# Patient Record
Sex: MALE | Race: WHITE | NOT HISPANIC OR LATINO | Employment: OTHER | URBAN - METROPOLITAN AREA
[De-identification: names, ages, dates, MRNs, and addresses within clinical notes are randomized per-mention and may not be internally consistent; named-entity substitution may affect disease eponyms.]

---

## 2023-04-20 DIAGNOSIS — I10 BENIGN ESSENTIAL HYPERTENSION: Primary | ICD-10-CM

## 2023-04-25 PROBLEM — I10 BENIGN ESSENTIAL HYPERTENSION: Status: ACTIVE | Noted: 2023-04-25

## 2023-04-25 RX ORDER — CHLORTHALIDONE 25 MG/1
TABLET ORAL
Qty: 45 TABLET | Refills: 3 | Status: SHIPPED | OUTPATIENT
Start: 2023-04-25 | End: 2024-01-04

## 2023-06-01 DIAGNOSIS — I10 BENIGN ESSENTIAL HYPERTENSION: ICD-10-CM

## 2023-06-05 RX ORDER — METOPROLOL SUCCINATE 50 MG/1
TABLET, EXTENDED RELEASE ORAL
Qty: 270 TABLET | Refills: 3 | Status: SHIPPED | OUTPATIENT
Start: 2023-06-05 | End: 2024-01-04

## 2023-06-05 RX ORDER — OMEPRAZOLE 40 MG/1
CAPSULE, DELAYED RELEASE ORAL
Qty: 180 CAPSULE | Refills: 3 | Status: SHIPPED | OUTPATIENT
Start: 2023-06-05 | End: 2024-01-04

## 2023-07-14 DIAGNOSIS — E03.9 HYPOTHYROIDISM, UNSPECIFIED TYPE: Primary | ICD-10-CM

## 2023-07-18 RX ORDER — LEVOTHYROXINE SODIUM 75 UG/1
TABLET ORAL
Qty: 103 TABLET | Refills: 3 | Status: SHIPPED | OUTPATIENT
Start: 2023-07-18 | End: 2024-02-15 | Stop reason: SDUPTHER

## 2023-08-16 LAB
ANION GAP IN SER/PLAS: 14 MMOL/L (ref 10–20)
CALCIUM (MG/DL) IN SER/PLAS: 9.9 MG/DL (ref 8.6–10.6)
CARBON DIOXIDE, TOTAL (MMOL/L) IN SER/PLAS: 28 MMOL/L (ref 21–32)
CHLORIDE (MMOL/L) IN SER/PLAS: 103 MMOL/L (ref 98–107)
CREATININE (MG/DL) IN SER/PLAS: 1.09 MG/DL (ref 0.5–1.3)
GFR MALE: 72 ML/MIN/1.73M2
GLUCOSE (MG/DL) IN SER/PLAS: 118 MG/DL (ref 74–99)
POTASSIUM (MMOL/L) IN SER/PLAS: 3.9 MMOL/L (ref 3.5–5.3)
SODIUM (MMOL/L) IN SER/PLAS: 141 MMOL/L (ref 136–145)
UREA NITROGEN (MG/DL) IN SER/PLAS: 18 MG/DL (ref 6–23)

## 2023-08-22 ENCOUNTER — HOSPITAL ENCOUNTER (OUTPATIENT)
Dept: DATA CONVERSION | Facility: HOSPITAL | Age: 73
End: 2023-08-22
Attending: OPHTHALMOLOGY | Admitting: OPHTHALMOLOGY
Payer: MEDICARE

## 2023-08-22 DIAGNOSIS — H25.811 COMBINED FORMS OF AGE-RELATED CATARACT, RIGHT EYE: ICD-10-CM

## 2023-08-22 DIAGNOSIS — H21.271: ICD-10-CM

## 2023-08-22 DIAGNOSIS — H26.9 UNSPECIFIED CATARACT: ICD-10-CM

## 2023-08-30 ENCOUNTER — OFFICE VISIT (OUTPATIENT)
Dept: PRIMARY CARE | Facility: CLINIC | Age: 73
End: 2023-08-30
Payer: MEDICARE

## 2023-08-30 ENCOUNTER — LAB (OUTPATIENT)
Dept: LAB | Facility: LAB | Age: 73
End: 2023-08-30
Payer: MEDICARE

## 2023-08-30 VITALS
WEIGHT: 194 LBS | TEMPERATURE: 98.7 F | HEIGHT: 64 IN | BODY MASS INDEX: 33.12 KG/M2 | HEART RATE: 80 BPM | SYSTOLIC BLOOD PRESSURE: 114 MMHG | DIASTOLIC BLOOD PRESSURE: 67 MMHG

## 2023-08-30 DIAGNOSIS — E03.9 HYPOTHYROIDISM, UNSPECIFIED TYPE: ICD-10-CM

## 2023-08-30 DIAGNOSIS — Z12.5 PROSTATE CANCER SCREENING: ICD-10-CM

## 2023-08-30 DIAGNOSIS — I25.10 CORONARY ARTERIOSCLEROSIS: ICD-10-CM

## 2023-08-30 DIAGNOSIS — I10 BENIGN ESSENTIAL HYPERTENSION: ICD-10-CM

## 2023-08-30 DIAGNOSIS — I48.0 PAROXYSMAL ATRIAL FIBRILLATION (MULTI): ICD-10-CM

## 2023-08-30 DIAGNOSIS — Z00.00 ROUTINE GENERAL MEDICAL EXAMINATION AT HEALTH CARE FACILITY: Primary | ICD-10-CM

## 2023-08-30 DIAGNOSIS — K22.70 BARRETT'S ESOPHAGUS WITHOUT DYSPLASIA: ICD-10-CM

## 2023-08-30 PROBLEM — M11.269 CHONDROCALCINOSIS DUE TO DICALCIUM PHOSPHATE CRYSTALS, OF THE KNEE: Status: ACTIVE | Noted: 2023-08-30

## 2023-08-30 PROBLEM — E78.00 HYPERCHOLESTEROLEMIA: Status: ACTIVE | Noted: 2023-08-30

## 2023-08-30 PROBLEM — H53.2 MONOCULAR DIPLOPIA OF LEFT EYE: Status: ACTIVE | Noted: 2023-08-30

## 2023-08-30 PROBLEM — E66.9 OBESITY (BMI 30-39.9): Status: ACTIVE | Noted: 2023-08-30

## 2023-08-30 PROBLEM — M40.30 FLAT-BACK SYNDROME: Status: ACTIVE | Noted: 2023-08-30

## 2023-08-30 PROBLEM — M48.02 CERVICAL SPINAL STENOSIS: Status: ACTIVE | Noted: 2023-08-30

## 2023-08-30 PROBLEM — N40.1 BPH WITH OBSTRUCTION/LOWER URINARY TRACT SYMPTOMS: Status: ACTIVE | Noted: 2023-08-30

## 2023-08-30 PROBLEM — F32.A DEPRESSION: Status: ACTIVE | Noted: 2023-08-30

## 2023-08-30 PROBLEM — N13.8 BPH WITH OBSTRUCTION/LOWER URINARY TRACT SYMPTOMS: Status: ACTIVE | Noted: 2023-08-30

## 2023-08-30 PROBLEM — C44.229 SQUAMOUS CELL CARCINOMA OF SKIN OF LEFT EAR AND EXTERNAL AURICULAR CANAL: Status: ACTIVE | Noted: 2020-11-03

## 2023-08-30 PROBLEM — F41.9 ANXIETY DISORDER: Status: ACTIVE | Noted: 2023-08-30

## 2023-08-30 PROBLEM — H90.3 BILATERAL SENSORINEURAL HEARING LOSS: Status: ACTIVE | Noted: 2023-08-30

## 2023-08-30 LAB
ALANINE AMINOTRANSFERASE (SGPT) (U/L) IN SER/PLAS: 18 U/L (ref 10–52)
ALBUMIN (G/DL) IN SER/PLAS: 4.4 G/DL (ref 3.4–5)
ALKALINE PHOSPHATASE (U/L) IN SER/PLAS: 88 U/L (ref 33–136)
ANION GAP IN SER/PLAS: 11 MMOL/L (ref 10–20)
ASPARTATE AMINOTRANSFERASE (SGOT) (U/L) IN SER/PLAS: 17 U/L (ref 9–39)
BASOPHILS (10*3/UL) IN BLOOD BY AUTOMATED COUNT: 0.02 X10E9/L (ref 0–0.1)
BASOPHILS/100 LEUKOCYTES IN BLOOD BY AUTOMATED COUNT: 0.2 % (ref 0–2)
BILIRUBIN TOTAL (MG/DL) IN SER/PLAS: 0.5 MG/DL (ref 0–1.2)
CALCIUM (MG/DL) IN SER/PLAS: 9.7 MG/DL (ref 8.6–10.6)
CARBON DIOXIDE, TOTAL (MMOL/L) IN SER/PLAS: 29 MMOL/L (ref 21–32)
CHLORIDE (MMOL/L) IN SER/PLAS: 104 MMOL/L (ref 98–107)
CHOLESTEROL (MG/DL) IN SER/PLAS: 109 MG/DL (ref 0–199)
CHOLESTEROL IN HDL (MG/DL) IN SER/PLAS: 44.2 MG/DL
CHOLESTEROL/HDL RATIO: 2.5
CREATININE (MG/DL) IN SER/PLAS: 1.04 MG/DL (ref 0.5–1.3)
EOSINOPHILS (10*3/UL) IN BLOOD BY AUTOMATED COUNT: 0.02 X10E9/L (ref 0–0.4)
EOSINOPHILS/100 LEUKOCYTES IN BLOOD BY AUTOMATED COUNT: 0.2 % (ref 0–6)
ERYTHROCYTE DISTRIBUTION WIDTH (RATIO) BY AUTOMATED COUNT: 13.7 % (ref 11.5–14.5)
ERYTHROCYTE MEAN CORPUSCULAR HEMOGLOBIN CONCENTRATION (G/DL) BY AUTOMATED: 31.9 G/DL (ref 32–36)
ERYTHROCYTE MEAN CORPUSCULAR VOLUME (FL) BY AUTOMATED COUNT: 97 FL (ref 80–100)
ERYTHROCYTES (10*6/UL) IN BLOOD BY AUTOMATED COUNT: 3.8 X10E12/L (ref 4.5–5.9)
GFR MALE: 76 ML/MIN/1.73M2
GLUCOSE (MG/DL) IN SER/PLAS: 86 MG/DL (ref 74–99)
HEMATOCRIT (%) IN BLOOD BY AUTOMATED COUNT: 36.7 % (ref 41–52)
HEMOGLOBIN (G/DL) IN BLOOD: 11.7 G/DL (ref 13.5–17.5)
IMMATURE GRANULOCYTES/100 LEUKOCYTES IN BLOOD BY AUTOMATED COUNT: 0.2 % (ref 0–0.9)
LDL: 35 MG/DL (ref 0–99)
LEUKOCYTES (10*3/UL) IN BLOOD BY AUTOMATED COUNT: 8.9 X10E9/L (ref 4.4–11.3)
LYMPHOCYTES (10*3/UL) IN BLOOD BY AUTOMATED COUNT: 1.47 X10E9/L (ref 0.8–3)
LYMPHOCYTES/100 LEUKOCYTES IN BLOOD BY AUTOMATED COUNT: 16.5 % (ref 13–44)
MONOCYTES (10*3/UL) IN BLOOD BY AUTOMATED COUNT: 0.82 X10E9/L (ref 0.05–0.8)
MONOCYTES/100 LEUKOCYTES IN BLOOD BY AUTOMATED COUNT: 9.2 % (ref 2–10)
NEUTROPHILS (10*3/UL) IN BLOOD BY AUTOMATED COUNT: 6.56 X10E9/L (ref 1.6–5.5)
NEUTROPHILS/100 LEUKOCYTES IN BLOOD BY AUTOMATED COUNT: 73.7 % (ref 40–80)
NRBC (PER 100 WBCS) BY AUTOMATED COUNT: 0 /100 WBC (ref 0–0)
PLATELETS (10*3/UL) IN BLOOD AUTOMATED COUNT: 231 X10E9/L (ref 150–450)
POTASSIUM (MMOL/L) IN SER/PLAS: 3.7 MMOL/L (ref 3.5–5.3)
PROSTATE SPECIFIC ANTIGEN,SCREEN: 0.96 NG/ML (ref 0–4)
PROTEIN TOTAL: 7 G/DL (ref 6.4–8.2)
SODIUM (MMOL/L) IN SER/PLAS: 140 MMOL/L (ref 136–145)
THYROTROPIN (MIU/L) IN SER/PLAS BY DETECTION LIMIT <= 0.05 MIU/L: 2.35 MIU/L (ref 0.44–3.98)
TRIGLYCERIDE (MG/DL) IN SER/PLAS: 147 MG/DL (ref 0–149)
UREA NITROGEN (MG/DL) IN SER/PLAS: 21 MG/DL (ref 6–23)
VLDL: 29 MG/DL (ref 0–40)

## 2023-08-30 PROCEDURE — 3074F SYST BP LT 130 MM HG: CPT | Performed by: INTERNAL MEDICINE

## 2023-08-30 PROCEDURE — 36415 COLL VENOUS BLD VENIPUNCTURE: CPT

## 2023-08-30 PROCEDURE — 1157F ADVNC CARE PLAN IN RCRD: CPT | Performed by: INTERNAL MEDICINE

## 2023-08-30 PROCEDURE — G0439 PPPS, SUBSEQ VISIT: HCPCS | Performed by: INTERNAL MEDICINE

## 2023-08-30 PROCEDURE — 84443 ASSAY THYROID STIM HORMONE: CPT

## 2023-08-30 PROCEDURE — 1170F FXNL STATUS ASSESSED: CPT | Performed by: INTERNAL MEDICINE

## 2023-08-30 PROCEDURE — G0103 PSA SCREENING: HCPCS

## 2023-08-30 PROCEDURE — 3008F BODY MASS INDEX DOCD: CPT | Performed by: INTERNAL MEDICINE

## 2023-08-30 PROCEDURE — 80061 LIPID PANEL: CPT

## 2023-08-30 PROCEDURE — 80053 COMPREHEN METABOLIC PANEL: CPT

## 2023-08-30 PROCEDURE — 1125F AMNT PAIN NOTED PAIN PRSNT: CPT | Performed by: INTERNAL MEDICINE

## 2023-08-30 PROCEDURE — 85025 COMPLETE CBC W/AUTO DIFF WBC: CPT

## 2023-08-30 PROCEDURE — 3078F DIAST BP <80 MM HG: CPT | Performed by: INTERNAL MEDICINE

## 2023-08-30 RX ORDER — ACETAMINOPHEN 500 MG
1000 TABLET ORAL 2 TIMES DAILY
COMMUNITY
Start: 2020-08-19

## 2023-08-30 RX ORDER — TAMSULOSIN HYDROCHLORIDE 0.4 MG/1
0.4 CAPSULE ORAL DAILY
COMMUNITY
Start: 2016-01-31 | End: 2024-01-06 | Stop reason: SDUPTHER

## 2023-08-30 RX ORDER — FLECAINIDE ACETATE 100 MG/1
300 TABLET ORAL ONCE AS NEEDED
COMMUNITY
Start: 2017-02-01

## 2023-08-30 RX ORDER — ATORVASTATIN CALCIUM 80 MG/1
80 TABLET, FILM COATED ORAL DAILY
COMMUNITY
Start: 2013-02-01 | End: 2023-10-06

## 2023-08-30 RX ORDER — APIXABAN 5 MG/1
1 TABLET, FILM COATED ORAL 2 TIMES DAILY
COMMUNITY
Start: 2017-02-21 | End: 2023-12-04

## 2023-08-30 RX ORDER — EPINEPHRINE 0.22MG
AEROSOL WITH ADAPTER (ML) INHALATION
COMMUNITY
End: 2023-11-29 | Stop reason: ALTCHOICE

## 2023-08-30 RX ORDER — KETOROLAC TROMETHAMINE 5 MG/ML
SOLUTION OPHTHALMIC
COMMUNITY
Start: 2023-06-20 | End: 2023-10-23 | Stop reason: WASHOUT

## 2023-08-30 RX ORDER — SILDENAFIL 50 MG/1
50 TABLET, FILM COATED ORAL AS NEEDED
COMMUNITY
Start: 2023-06-14 | End: 2024-02-14 | Stop reason: SDUPTHER

## 2023-08-30 RX ORDER — OMEGA-3 FATTY ACIDS/FISH OIL 340-1000MG
CAPSULE ORAL 2 TIMES DAILY
COMMUNITY

## 2023-08-30 RX ORDER — PREDNISOLONE ACETATE 10 MG/ML
SUSPENSION/ DROPS OPHTHALMIC 4 TIMES DAILY
COMMUNITY
Start: 2023-06-20 | End: 2023-10-23 | Stop reason: WASHOUT

## 2023-08-30 RX ORDER — OFLOXACIN 3 MG/ML
SOLUTION/ DROPS OPHTHALMIC 4 TIMES DAILY
COMMUNITY
Start: 2023-06-20 | End: 2023-10-23 | Stop reason: WASHOUT

## 2023-08-30 RX ORDER — BUPROPION HYDROCHLORIDE 150 MG/1
2 TABLET ORAL DAILY
COMMUNITY

## 2023-08-30 RX ORDER — GABAPENTIN 300 MG/1
300 CAPSULE ORAL NIGHTLY
COMMUNITY
End: 2024-03-04 | Stop reason: SDUPTHER

## 2023-08-30 ASSESSMENT — ACTIVITIES OF DAILY LIVING (ADL)
BATHING: INDEPENDENT
DRESSING: INDEPENDENT
MANAGING_FINANCES: INDEPENDENT
DOING_HOUSEWORK: INDEPENDENT
GROCERY_SHOPPING: INDEPENDENT
TAKING_MEDICATION: INDEPENDENT

## 2023-08-30 NOTE — PROGRESS NOTES
"Subjective   Reason for Visit: Matt Huber is an 73 y.o. male here for a Medicare Wellness visit.               Underwent multilevel spinal fusion from T4 to ileum this winter. Still cannot stand totally upright. Has gone through long rehabilitation.         Patient Care Team:  Pedro Tesfaye MD as PCP - General  Pedro Tesfaye MD as PCP - [facility] Attributed Provider   Current Outpatient Medications   Medication Instructions    acetaminophen (TYLENOL) 1,000 mg, oral, 2 times daily    atorvastatin (LIPITOR) 80 mg, oral, Daily    buPROPion XL (Wellbutrin XL) 150 mg 24 hr tablet 2 tablets, oral, Daily    chlorthalidone (Hygroton) 25 mg tablet TAKE ONE-HALF (1/2) TABLET DAILY    coenzyme Q-10 100 mg capsule oral    Eliquis 5 mg tablet 1 tablet, oral, 2 times daily    flecainide (TAMBOCOR) 300 mg, oral, Once as needed    gabapentin (NEURONTIN) 300 mg, oral, Nightly    ketorolac (Acular) 0.5 % ophthalmic solution ophthalmic (eye)    levothyroxine (Synthroid, Levoxyl) 75 mcg tablet TAKE 1 TABLET DAILY AND 2 TABLETS ON SUNDAYS    metoprolol succinate XL (Toprol-XL) 50 mg 24 hr tablet TAKE 3 TABLETS DAILY    ofloxacin (Ocuflox) 0.3 % ophthalmic solution ophthalmic (eye), 4 times daily    omega-3 fatty acids-fish oil (Fish OiL) 340-1,000 mg capsule oral, 2 times daily    omeprazole (PriLOSEC) 40 mg DR capsule TAKE 1 CAPSULE TWICE A DAY BEFORE MEALS    prednisoLONE acetate (Pred-Forte) 1 % ophthalmic suspension ophthalmic (eye), 4 times daily    sildenafil (Viagra) 50 mg tablet oral    tamsulosin (Flomax) 0.4 mg 24 hr capsule oral, 2 times daily      Review of Systems    Objective   Vitals:  /67   Pulse 80   Temp 37.1 °C (98.7 °F)   Ht 1.626 m (5' 4\")   Wt 88 kg (194 lb)   BMI 33.30 kg/m²       Physical Exam  Constitutional: Alert and in no acute distress  Inspection of Eyes: Sclera and conjunctivae normal.  Pupil exam: PERRL. EOMI.  Tympanic membranes are normal. External ears appear normal.   Oropharynx: " Normal with MMM.   Neck: Thyroid normal. No cervical lymphadenopathy. No carotid bruit.  No cervical, axillary, or inguinal lymphadenopathy.  Breasts: No masses.   Lungs are clear to auscultation and percussion.  Cardiac: S1 and S2 are normal. No murmurs, rubs, or gallops. No edema.   Abdomen: Soft, nontender. Normal bowel sounds. No hepatosplenomegaly or masses.  Musculoskeletal: Examination of gait is normal. No clubbing or cyanosis. Full range of motion of joints. NO swelling, tenderness, or limitation of motion. Back scar is well healed.  Skin normal skin color and pigmentation. No visible rash. Nml skin turgor.  Neurological: Cranial nerves II-XII intact. No focal motor weakness. Sensation and vibration are normal. No pronator drift. Coordination normal. Balance normal.  Psychiatric: A&Ox3. Mood and affect normal.    Assessment/Plan   Problem List Items Addressed This Visit       Benign essential hypertension    Current Assessment & Plan     Well controlled. Continue Current Management         Pitts's esophagus    Current Assessment & Plan     Continue PPI         Coronary arteriosclerosis    Overview     2014 coronary cath 2014         Current Assessment & Plan     Continue high dose statin. Check lipids.         Relevant Medications    sildenafil (Viagra) 50 mg tablet    Other Relevant Orders    CBC and Auto Differential    Comprehensive Metabolic Panel    Lipid Panel    Hypothyroidism    Current Assessment & Plan     Assess TSH. Clinically euthyroid.         Relevant Orders    Thyroid Stimulating Hormone    Paroxysmal atrial fibrillation (CMS/HCC)    Current Assessment & Plan     No recent episodes of afib.         Relevant Medications    sildenafil (Viagra) 50 mg tablet     Other Visit Diagnoses       Routine general medical examination at health care facility    -  Primary    Prostate cancer screening        Relevant Orders    Prostate Specific Antigen, Screen

## 2023-09-01 NOTE — RESULT ENCOUNTER NOTE
Dear Patient,    GOOD NEWS    Attached to this note is a copy of the testing that I have ordered.The results indicate that there are no significant abnormalities in your results, even if some of the findings are reported as abnormal.    Please continue your current treatment plan as we have discussed and return for follow up as planned.    Do not hesitate to contact me if you have any questions or concerns.    Sincerely,  Pedro Tesfaye M.D.

## 2023-09-13 ENCOUNTER — HOSPITAL ENCOUNTER (OUTPATIENT)
Dept: DATA CONVERSION | Facility: HOSPITAL | Age: 73
End: 2023-09-13
Attending: OPHTHALMOLOGY | Admitting: OPHTHALMOLOGY
Payer: MEDICARE

## 2023-09-13 DIAGNOSIS — H25.812 COMBINED FORMS OF AGE-RELATED CATARACT, LEFT EYE: ICD-10-CM

## 2023-09-13 DIAGNOSIS — H26.9 UNSPECIFIED CATARACT: ICD-10-CM

## 2023-09-30 NOTE — H&P
History of Present Illness:   History Present Illness:  Reason for surgery: cat Od   HPI:    blurred vision affecting ADL      Allergies:        Allergies:  ·  ACE inhibitors : Other       Intolerances:  ·  codeine : Nausea/Vomiting  ·  sulfa  drugs: Nausea/Vomiting    Home Medication Review:   Home Medications Reviewed: yes     Impression/Procedure:   ·  Impression and Planned Procedure: cataract OD  Phaco IOL OD       ERAS (Enhanced Recovery After Surgery):  ·  ERAS Patient: no       Physical Exam by System:    Respiratory/Thorax: Patent airways, CTAB, normal  breath sounds with good chest expansion, thorax symmetric   Cardiovascular: Regular, rate and rhythm, no murmurs,  2+ equal pulses of the extremities, normal S 1and S 2     Consent:   COVID-19 Consent:  ·  COVID-19 Risk Consent Surgeon has reviewed key risks related to the risk of arleen COVID-19 and if they contract COVID-19 what the risks are.       Electronic Signatures:  Amanda Tsang)  (Signed 21-Aug-2023 13:27)   Authored: History of Present Illness, Allergies, Home  Medication Review, Impression/Procedure, ERAS, Physical Exam, Consent, Note Completion      Last Updated: 21-Aug-2023 13:27 by Amanda Tsang)

## 2023-09-30 NOTE — H&P
History of Present Illness:   History Present Illness:  Reason for surgery: cat OS   HPI:    blurred vision affecting ADL      Allergies:        Allergies:  ·  ACE inhibitors : Other       Intolerances:  ·  codeine : Nausea/Vomiting  ·  sulfa  drugs: Nausea/Vomiting    Home Medication Review:   Home Medications Reviewed: yes     Impression/Procedure:   ·  Impression and Planned Procedure: cataract OS  Phaco IOL OS       ERAS (Enhanced Recovery After Surgery):  ·  ERAS Patient: no       Physical Exam by System:    Respiratory/Thorax: Patent airways, CTAB, normal  breath sounds with good chest expansion, thorax symmetric   Cardiovascular: Regular, rate and rhythm, no murmurs,  2+ equal pulses of the extremities, normal S 1and S 2     Consent:   COVID-19 Consent:  ·  COVID-19 Risk Consent Surgeon has reviewed key risks related to the risk of arleen COVID-19 and if they contract COVID-19 what the risks are.       Electronic Signatures:  Amanda Tsang)  (Signed 12-Sep-2023 17:02)   Authored: History of Present Illness, Allergies, Home  Medication Review, Impression/Procedure, ERAS, Physical Exam, Consent, Note Completion      Last Updated: 12-Sep-2023 17:02 by Amanda Tsang)

## 2023-10-01 NOTE — OP NOTE
Post Operative Note:     Post-Procedure Diagnosis: cat OD   Procedure: 1. phaco IOL OD complex  2.   3.   4.   5.   Surgeon: Sharan   Resident/Fellow/Other Assistant: none   Estimated Blood Loss (mL): none   Specimen: no   Findings: used iris hooks     Operative Report Dictated:  Dictation: not applicable - note contains Operative  Report   Operative Report:    The patient was prepped and draped in a sterile fashion. the lid speculum was placed in the  eye.  a paracentesis was made and preservative free lidocaine was injected. Trypan blue was injected for anterior capsule staining due to poor red reflex. Viscoat was injected into to anterior chamber. iris hooks were placed at 12,3,6,9:00 positions to expand  poorly dilated pupil.  A temporal incision was made. A capsulorhexus was made. Hydrodissection was done. Phacoemulsification was used to sculpt, divide and remove the nucleus. Cortex was removed with irrigation/aspiration. Provisc was injected into the  capsular bag. The lens implant, model DI B00 30.0diopter was injected and rotated into position. Viscoelastic was removed,hooks were removed, miochol was injected  and the incision was hydrated and  demonstrated to be watertight. The patient tolerated  the procedure well and there were no complications. Follow up in 1 day        Electronic Signatures:  Amanda Tsang)  (Signed 22-Aug-2023 10:11)   Authored: Post Operative Note, Note Completion      Last Updated: 22-Aug-2023 10:11 by Amanda Tsang)

## 2023-10-01 NOTE — OP NOTE
Post Operative Note:     Post-Procedure Diagnosis: cataract OS   Procedure: 1. phaco IOL OS COmplex  2.   3.   4.   5.   Surgeon: Sharan   Resident/Fellow/Other Assistant: none   Estimated Blood Loss (mL): none   Specimen: no   Findings: iris hooks used     Operative Report Dictated:  Dictation: not applicable - note contains Operative  Report   Operative Report:    The patient was prepped and draped in a sterile fashion. the lid speculum was placed in the  eye.  a paracentesis was made and preservative free lidocaine was injected. Trypan blue was injected for anterior capsule staining due to poor red reflex. Viscoat was injected into to anterior chamber. A temporal incision was made. Due to poor iris dilation,  iris hooks were placed at 3,6,9,12 at limbus. A capsulorhexus was made. Hydrodissection was done. Phacoemulsification was used to sculpt, divide and remove the nucleus. Cortex was removed with irrigation/aspiration. Provisc was injected into the capsular  bag. The lens implant, model DIB00 29.0diopter was injected and rotated into position. Viscoelastic was removed and the incision was hydrated and  demonstrated to be watertight. The patient tolerated the procedure well and there were no complications.  Follow up in 1 day CDE:9.60        Electronic Signatures:  Amanda Tsang)  (Signed 13-Sep-2023 14:45)   Authored: Post Operative Note, Note Completion      Last Updated: 13-Sep-2023 14:45 by Amanda Tsang)

## 2023-10-06 DIAGNOSIS — E78.00 HYPERCHOLESTEROLEMIA: Primary | ICD-10-CM

## 2023-10-06 RX ORDER — ATORVASTATIN CALCIUM 80 MG/1
80 TABLET, FILM COATED ORAL NIGHTLY
Qty: 90 TABLET | Refills: 3 | Status: SHIPPED | OUTPATIENT
Start: 2023-10-06 | End: 2024-01-04

## 2023-10-10 ENCOUNTER — APPOINTMENT (OUTPATIENT)
Dept: PRIMARY CARE | Facility: CLINIC | Age: 73
End: 2023-10-10
Payer: MEDICARE

## 2023-10-21 PROBLEM — R63.4 ABNORMAL WEIGHT LOSS: Status: ACTIVE | Noted: 2023-10-21

## 2023-10-21 PROBLEM — H52.03 HYPERMETROPIA OF BOTH EYES: Status: ACTIVE | Noted: 2023-10-21

## 2023-10-21 PROBLEM — M17.12 PRIMARY OSTEOARTHRITIS OF LEFT KNEE: Status: ACTIVE | Noted: 2023-10-21

## 2023-10-21 PROBLEM — Z96.1 PSEUDOPHAKIA OF LEFT EYE: Status: ACTIVE | Noted: 2023-10-21

## 2023-10-21 PROBLEM — R06.02 SHORTNESS OF BREATH: Status: ACTIVE | Noted: 2023-10-21

## 2023-10-21 PROBLEM — M19.039 WRIST ARTHRITIS: Status: ACTIVE | Noted: 2023-10-21

## 2023-10-21 PROBLEM — L03.011 PARONYCHIA OF FINGER OF RIGHT HAND: Status: ACTIVE | Noted: 2023-10-21

## 2023-10-21 PROBLEM — M65.4 DE QUERVAIN'S TENOSYNOVITIS: Status: ACTIVE | Noted: 2023-10-21

## 2023-10-21 PROBLEM — M70.72 HIP BURSITIS, LEFT: Status: ACTIVE | Noted: 2023-10-21

## 2023-10-21 PROBLEM — M48.062 LUMBAR STENOSIS WITH NEUROGENIC CLAUDICATION: Status: ACTIVE | Noted: 2023-10-21

## 2023-10-21 PROBLEM — H52.13 MYOPIA WITH PRESBYOPIA OF BOTH EYES: Status: ACTIVE | Noted: 2023-10-21

## 2023-10-21 PROBLEM — R39.89 OTHER SYMPTOMS AND SIGNS INVOLVING THE GENITOURINARY SYSTEM: Status: ACTIVE | Noted: 2023-10-21

## 2023-10-21 PROBLEM — M65.321 TRIGGER INDEX FINGER OF RIGHT HAND: Status: ACTIVE | Noted: 2023-10-21

## 2023-10-21 PROBLEM — H25.813 COMBINED FORMS OF AGE-RELATED CATARACT OF BOTH EYES: Status: ACTIVE | Noted: 2023-10-21

## 2023-10-21 PROBLEM — H35.073 TYPE 2 MACULAR TELANGIECTASIS OF BOTH EYES: Status: ACTIVE | Noted: 2023-10-21

## 2023-10-21 PROBLEM — M41.9 KYPHOSCOLIOSIS: Status: ACTIVE | Noted: 2023-10-21

## 2023-10-21 PROBLEM — M65.332 TRIGGER MIDDLE FINGER OF LEFT HAND: Status: ACTIVE | Noted: 2023-10-21

## 2023-10-21 PROBLEM — M77.12 LATERAL EPICONDYLITIS OF LEFT ELBOW: Status: ACTIVE | Noted: 2023-10-21

## 2023-10-21 PROBLEM — M71.9 DISORDER OF BURSAE AND TENDONS IN LEFT SHOULDER REGION: Status: ACTIVE | Noted: 2023-10-21

## 2023-10-21 PROBLEM — K43.9 LATERAL VENTRAL HERNIA: Status: ACTIVE | Noted: 2023-10-21

## 2023-10-21 PROBLEM — R29.890 HEIGHT LOSS: Status: ACTIVE | Noted: 2023-10-21

## 2023-10-21 PROBLEM — N39.41 URGE INCONTINENCE: Status: ACTIVE | Noted: 2023-10-21

## 2023-10-21 PROBLEM — H52.4 MYOPIA WITH PRESBYOPIA OF BOTH EYES: Status: ACTIVE | Noted: 2023-10-21

## 2023-10-21 PROBLEM — H91.93 HEARING LOSS, BILATERAL: Status: ACTIVE | Noted: 2023-10-21

## 2023-10-21 PROBLEM — M76.899 TENDINITIS OF HIP: Status: ACTIVE | Noted: 2023-10-21

## 2023-10-21 PROBLEM — M16.12 OSTEOARTHRITIS OF LEFT HIP: Status: ACTIVE | Noted: 2023-10-21

## 2023-10-21 PROBLEM — H53.001 AMBLYOPIA OF RIGHT EYE: Status: ACTIVE | Noted: 2023-10-21

## 2023-10-21 PROBLEM — M67.912 DISORDER OF BURSAE AND TENDONS IN LEFT SHOULDER REGION: Status: ACTIVE | Noted: 2023-10-21

## 2023-10-21 PROBLEM — M54.12 CERVICAL RADICULOPATHY: Status: ACTIVE | Noted: 2023-10-21

## 2023-10-21 PROBLEM — N52.9 MALE ERECTILE DISORDER: Status: ACTIVE | Noted: 2023-10-21

## 2023-10-21 PROBLEM — M70.62 TROCHANTERIC BURSITIS OF LEFT HIP: Status: ACTIVE | Noted: 2023-10-21

## 2023-10-21 PROBLEM — M65.322 TRIGGER INDEX FINGER OF LEFT HAND: Status: ACTIVE | Noted: 2023-10-21

## 2023-10-21 PROBLEM — R22.0 LIP SWELLING: Status: ACTIVE | Noted: 2023-10-21

## 2023-10-21 PROBLEM — M17.0 LOCALIZED PRIMARY OSTEOARTHRITIS OF BOTH LOWER LEGS: Status: ACTIVE | Noted: 2023-10-21

## 2023-10-21 PROBLEM — M65.331 TRIGGER MIDDLE FINGER OF RIGHT HAND: Status: ACTIVE | Noted: 2023-10-21

## 2023-10-21 PROBLEM — M40.202 CERVICAL KYPHOSIS: Status: ACTIVE | Noted: 2023-10-21

## 2023-10-21 PROBLEM — G47.33 OBSTRUCTIVE SLEEP APNEA: Status: ACTIVE | Noted: 2023-10-21

## 2023-10-21 PROBLEM — M43.12 SPONDYLOLISTHESIS OF CERVICAL REGION: Status: ACTIVE | Noted: 2023-10-21

## 2023-10-21 PROBLEM — H50.112: Status: ACTIVE | Noted: 2023-10-21

## 2023-10-21 PROBLEM — H52.00 HYPEROPIA: Status: ACTIVE | Noted: 2023-10-21

## 2023-10-21 PROBLEM — R73.03 PREDIABETES: Status: ACTIVE | Noted: 2023-10-21

## 2023-10-23 ENCOUNTER — OFFICE VISIT (OUTPATIENT)
Dept: OPHTHALMOLOGY | Facility: HOSPITAL | Age: 73
End: 2023-10-23
Payer: MEDICARE

## 2023-10-23 DIAGNOSIS — H53.2 DIPLOPIA: Primary | ICD-10-CM

## 2023-10-23 PROCEDURE — 92015 DETERMINE REFRACTIVE STATE: CPT | Performed by: OPHTHALMOLOGY

## 2023-10-23 PROCEDURE — 99213 OFFICE O/P EST LOW 20 MIN: CPT | Performed by: OPHTHALMOLOGY

## 2023-10-23 RX ORDER — ALPRAZOLAM 0.25 MG/1
0.25 TABLET ORAL
COMMUNITY
Start: 2011-05-17 | End: 2023-11-29 | Stop reason: ALTCHOICE

## 2023-10-23 ASSESSMENT — SLIT LAMP EXAM - LIDS
COMMENTS: NORMAL
COMMENTS: NORMAL

## 2023-10-23 ASSESSMENT — EXTERNAL EXAM - RIGHT EYE: OD_EXAM: NORMAL

## 2023-10-23 ASSESSMENT — REFRACTION_MANIFEST
OD_SPHERE: -1.25
OD_CYLINDER: +1.25
OD_AXIS: 003
OS_CYLINDER: +1.25
OS_AXIS: 178
METHOD_AUTOREFRACTION: 1
OS_SPHERE: -1.75

## 2023-10-23 ASSESSMENT — VISUAL ACUITY
OS_SC: 20/25
METHOD: SNELLEN - LINEAR
OD_SC: 20/30

## 2023-10-23 ASSESSMENT — EXTERNAL EXAM - LEFT EYE: OS_EXAM: NORMAL

## 2023-10-23 ASSESSMENT — ENCOUNTER SYMPTOMS: EYES NEGATIVE: 1

## 2023-10-23 ASSESSMENT — REFRACTION_FINALRX
OD_VPRISM: 3 BD
OD_HPRISM: 6 BI

## 2023-10-23 NOTE — PROGRESS NOTES
Patient with diplopia (some of it monoocular)     New prescription given today     Follow up as needed

## 2023-11-07 ENCOUNTER — OFFICE VISIT (OUTPATIENT)
Dept: OPHTHALMOLOGY | Facility: CLINIC | Age: 73
End: 2023-11-07
Payer: MEDICARE

## 2023-11-07 DIAGNOSIS — H52.13 MYOPIA WITH PRESBYOPIA OF BOTH EYES: ICD-10-CM

## 2023-11-07 DIAGNOSIS — H52.4 MYOPIA WITH PRESBYOPIA OF BOTH EYES: ICD-10-CM

## 2023-11-07 DIAGNOSIS — H35.073 TYPE 2 MACULAR TELANGIECTASIS OF BOTH EYES: ICD-10-CM

## 2023-11-07 DIAGNOSIS — H35.073 RETINAL TELANGIECTASIA OF BOTH EYES: ICD-10-CM

## 2023-11-07 DIAGNOSIS — H53.001 AMBLYOPIA OF RIGHT EYE: ICD-10-CM

## 2023-11-07 DIAGNOSIS — H52.13 MYOPIA, BILATERAL: Primary | ICD-10-CM

## 2023-11-07 PROCEDURE — 99214 OFFICE O/P EST MOD 30 MIN: CPT | Performed by: OPHTHALMOLOGY

## 2023-11-07 PROCEDURE — 92134 CPTRZ OPH DX IMG PST SGM RTA: CPT | Mod: BILATERAL PROCEDURE | Performed by: OPHTHALMOLOGY

## 2023-11-07 ASSESSMENT — VISUAL ACUITY
OS_CC: 20/20
METHOD: SNELLEN - LINEAR
CORRECTION_TYPE: GLASSES
OD_CC: 20/20

## 2023-11-07 ASSESSMENT — CONF VISUAL FIELD
OS_NORMAL: 1
OS_SUPERIOR_TEMPORAL_RESTRICTION: 0
OS_INFERIOR_NASAL_RESTRICTION: 0
OD_NORMAL: 1
OS_SUPERIOR_NASAL_RESTRICTION: 0
OS_INFERIOR_TEMPORAL_RESTRICTION: 0
OD_INFERIOR_TEMPORAL_RESTRICTION: 0
OD_SUPERIOR_TEMPORAL_RESTRICTION: 0
OD_INFERIOR_NASAL_RESTRICTION: 0
OD_SUPERIOR_NASAL_RESTRICTION: 0

## 2023-11-07 ASSESSMENT — TONOMETRY
OS_IOP_MMHG: 12
IOP_METHOD: GOLDMANN APPLANATION
OD_IOP_MMHG: 13

## 2023-11-07 ASSESSMENT — ENCOUNTER SYMPTOMS: EYES NEGATIVE: 1

## 2023-11-07 ASSESSMENT — EXTERNAL EXAM - RIGHT EYE: OD_EXAM: NORMAL

## 2023-11-07 ASSESSMENT — CUP TO DISC RATIO
OD_RATIO: 0.3
OS_RATIO: 0.4

## 2023-11-07 ASSESSMENT — SLIT LAMP EXAM - LIDS
COMMENTS: NORMAL
COMMENTS: NORMAL

## 2023-11-07 ASSESSMENT — EXTERNAL EXAM - LEFT EYE: OS_EXAM: NORMAL

## 2023-11-07 NOTE — PROGRESS NOTES
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, both eyes-Stable  7 H52.00 Hyperopia-Stable  8 H50.10 Exotropia of right eye-Stable  9 H53.2 Diplopia-Stable  10 H53.2 Monocular diplopia of left eye-Stable      Myopic retinoapthy  Amblopia right eye (OD)  Recent intraocular lens (IOL) PO doing well   Assessment/Plan   Diagnoses and all orders for this visit:  Myopia, bilateral  -     OCT, Retina - OU - Both Eyes  Retinal telangiectasia of both eyes  -     OCT, Retina - OU - Both Eyes  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

## 2023-11-28 ENCOUNTER — APPOINTMENT (OUTPATIENT)
Dept: CARDIOLOGY | Facility: HOSPITAL | Age: 73
End: 2023-11-28
Payer: MEDICARE

## 2023-11-29 ENCOUNTER — OFFICE VISIT (OUTPATIENT)
Dept: CARDIOLOGY | Facility: HOSPITAL | Age: 73
End: 2023-11-29
Payer: MEDICARE

## 2023-11-29 VITALS
BODY MASS INDEX: 35 KG/M2 | WEIGHT: 205 LBS | SYSTOLIC BLOOD PRESSURE: 120 MMHG | DIASTOLIC BLOOD PRESSURE: 74 MMHG | HEART RATE: 62 BPM | HEIGHT: 64 IN

## 2023-11-29 DIAGNOSIS — I25.10 CORONARY ARTERIOSCLEROSIS: ICD-10-CM

## 2023-11-29 DIAGNOSIS — E78.00 HYPERCHOLESTEROLEMIA: ICD-10-CM

## 2023-11-29 DIAGNOSIS — I10 BENIGN ESSENTIAL HYPERTENSION: ICD-10-CM

## 2023-11-29 DIAGNOSIS — I48.0 PAROXYSMAL ATRIAL FIBRILLATION (MULTI): Primary | ICD-10-CM

## 2023-11-29 PROCEDURE — 1160F RVW MEDS BY RX/DR IN RCRD: CPT | Performed by: NURSE PRACTITIONER

## 2023-11-29 PROCEDURE — 1036F TOBACCO NON-USER: CPT | Performed by: NURSE PRACTITIONER

## 2023-11-29 PROCEDURE — 99214 OFFICE O/P EST MOD 30 MIN: CPT | Mod: 25 | Performed by: NURSE PRACTITIONER

## 2023-11-29 PROCEDURE — 93010 ELECTROCARDIOGRAM REPORT: CPT | Performed by: INTERNAL MEDICINE

## 2023-11-29 PROCEDURE — 93005 ELECTROCARDIOGRAM TRACING: CPT | Performed by: NURSE PRACTITIONER

## 2023-11-29 PROCEDURE — 99214 OFFICE O/P EST MOD 30 MIN: CPT | Performed by: NURSE PRACTITIONER

## 2023-11-29 PROCEDURE — 3078F DIAST BP <80 MM HG: CPT | Performed by: NURSE PRACTITIONER

## 2023-11-29 PROCEDURE — 1159F MED LIST DOCD IN RCRD: CPT | Performed by: NURSE PRACTITIONER

## 2023-11-29 PROCEDURE — 3008F BODY MASS INDEX DOCD: CPT | Performed by: NURSE PRACTITIONER

## 2023-11-29 PROCEDURE — 3074F SYST BP LT 130 MM HG: CPT | Performed by: NURSE PRACTITIONER

## 2023-11-29 PROCEDURE — 1125F AMNT PAIN NOTED PAIN PRSNT: CPT | Performed by: NURSE PRACTITIONER

## 2023-11-29 RX ORDER — MULTIVIT-MINERALS/FOLIC ACID 120 MCG
1 TABLET,CHEWABLE ORAL DAILY
COMMUNITY

## 2023-11-29 ASSESSMENT — ENCOUNTER SYMPTOMS
LOSS OF SENSATION IN FEET: 0
DEPRESSION: 0
OCCASIONAL FEELINGS OF UNSTEADINESS: 0

## 2023-11-29 NOTE — PATIENT INSTRUCTIONS
Continue current cardiovascular medications  Follow up in May with Dr. Perrin  Recommend Mediterranean diet for weight loss  Increase activity as able  Blood work in February CBC and BMP

## 2023-11-29 NOTE — PROGRESS NOTES
Primary Care Physician: Pedro Tesfaye MD  Date of Visit: 11/29/2023 10:30 AM EST  Location of visit: [facility]     Chief Complaint:   Chief Complaint   Patient presents with    Follow-up    Atrial Fibrillation    Coronary Artery Disease    Hyperlipidemia    Hypertension        HPI / Summary:   Matt Huber is a 73 y.o. male presents for followup. Seen in collaboration with Dr. Perrin. He has no particular complaints. He is undergoing physical therapy for his back without chest pain or shortness of breath. He is able to ambulate up and down a flight of stairs without chest pain or dyspnea. He does have dyspnea ambulating up and down 3 flights of stairs. The patient denies chest pain, shortness of breath, palpitations, lightheadedness, syncope, orthopnea, paroxysmal nocturnal dyspnea, lower extremity edema, or bleeding problems.              Past Medical History:  Past Medical History:   Diagnosis Date    A-fib (CMS/[facility])     Acute bronchospasm 02/12/2019    Cough due to bronchospasm    Amblyopia of eye, right     Cardiomegaly 08/27/2013    Left ventricular hypertrophy    Cataract     Displaced fracture of coronoid process of left ulna, initial encounter for closed fracture 04/22/2015    Fracture of coronoid process of left ulna    Exotropia of right eye     Hypermetropia of both eyes     Hypertension     Medial epicondylitis, left elbow 04/13/2015    Medial epicondylitis of left elbow    Monocular diplopia     Other chondrocalcinosis, right knee 08/05/2015    Chondrocalcinosis of knee, right    Other enthesopathies, not elsewhere classified 03/31/2014    Tendonitis of shoulder    Other enthesopathies, not elsewhere classified 05/22/2015    Tendonitis of elbow, left    Pain in left knee 01/10/2020    Left knee pain    Pain in unspecified hip 02/24/2016    Hip pain    Pain in unspecified shoulder 03/18/2014    Shoulder pain    Paroxysmal atrial fibrillation (CMS/[facility]) 11/22/2022    Paroxysmal atrial  fibrillation    Personal history of other diseases of the circulatory system 01/31/2014    History of mitral valve insufficiency    Personal history of other diseases of the musculoskeletal system and connective tissue 12/30/2014    History of trigger finger    Presbyopia of both eyes     Strabismus     Type 2 macular telangiectasis     Unilateral primary osteoarthritis, left knee 01/21/2019    Primary localized osteoarthrosis of left lower leg    Unspecified acute lower respiratory infection 06/10/2016    Acute lower respiratory tract infection    Unspecified injury of left wrist, hand and finger(s), initial encounter 05/22/2015    Left wrist injury    Unspecified injury of unspecified elbow, initial encounter 05/22/2015    Elbow injury        Past Surgical History:  Past Surgical History:   Procedure Laterality Date    BACK SURGERY  02/04/2014    Back Surgery    CATARACT EXTRACTION W/  INTRAOCULAR LENS IMPLANT Left 09/13/2023    Dr Tsang    CATARACT EXTRACTION W/  INTRAOCULAR LENS IMPLANT Right 08/22/2023    Dr Tsang    KNEE SURGERY  02/04/2014    Knee Surgery Right    KNEE SURGERY  02/04/2014    Knee Surgery Left    MR HEAD ANGIO WO IV CONTRAST  01/06/2016    MR HEAD ANGIO WO IV CONTRAST 1/6/2016 [facility] [place] [facility]    MR NECK ANGIO WO IV CONTRAST  01/06/2016    MR NECK ANGIO WO IV CONTRAST 1/6/2016 [facility] [place] [facility]    OTHER SURGICAL HISTORY  08/19/2020    Strabismus surgery    OTHER SURGICAL HISTORY  08/19/2020    Knee replacement    OTHER SURGICAL HISTORY  02/04/2014    Wrist Carpectomy    SPINE SURGERY  01/2023    1.  L1/L2-L5/S1 bilateral laminectomy facetectomy and foraminotomies  2.  L5-S1 transforaminal lumbar interbody fusion with titanium interbody cage,  local bone graft and allograft chips  3.  T4 to ilium thoracolumbar fusion with Solera instrumentation, local bone  graft, allograft chips, demineralized bone matrix, and bone marrow aspirate 4.  Cement augmentation T4-T5 with Kyphon cement     TOTAL KNEE ARTHROPLASTY  08/09/2017    Total Knee Arthroplasty          Social History:   reports that he quit smoking about 47 years ago. His smoking use included cigarettes. He has never used smokeless tobacco. He reports current alcohol use.     Family History:  family history includes Colon cancer in his brother; Coronary artery disease in his father; Heart attack in his brother and father; Heart failure in his mother; Liver disease in his mother.      Allergies:  Allergies   Allergen Reactions    Ace Inhibitors Cough and Other     severe cough    Codeine Nausea Only     nausea    Sulfa (Sulfonamide Antibiotics) Other       Outpatient Medications:  Current Outpatient Medications   Medication Instructions    acetaminophen (TYLENOL) 1,000 mg, oral, 2 times daily    ALPRAZolam (XANAX) 0.25 mg, oral    atorvastatin (LIPITOR) 80 mg, oral, Nightly    buPROPion XL (Wellbutrin XL) 150 mg 24 hr tablet 2 tablets, oral, Daily    chlorthalidone (Hygroton) 25 mg tablet TAKE ONE-HALF (1/2) TABLET DAILY    coenzyme Q-10 100 mg capsule oral    Eliquis 5 mg tablet 1 tablet, oral, 2 times daily    flecainide (TAMBOCOR) 300 mg, oral, Once as needed    gabapentin (NEURONTIN) 300 mg, oral, Nightly    levothyroxine (Synthroid, Levoxyl) 75 mcg tablet TAKE 1 TABLET DAILY AND 2 TABLETS ON SUNDAYS    metoprolol succinate XL (Toprol-XL) 50 mg 24 hr tablet TAKE 3 TABLETS DAILY    multivitamin-children's (Cerovite, Jr) chewable tablet 1 tablet, oral, Daily    omega-3 fatty acids-fish oil (Fish OiL) 340-1,000 mg capsule oral, 2 times daily    omeprazole (PriLOSEC) 40 mg DR capsule TAKE 1 CAPSULE TWICE A DAY BEFORE MEALS    sildenafil (Viagra) 50 mg tablet oral    tamsulosin (Flomax) 0.4 mg 24 hr capsule oral, 2 times daily       Physical Exam:  There were no vitals filed for this visit.  Wt Readings from Last 5 Encounters:   08/30/23 88 kg (194 lb)   05/30/23 88.5 kg (195 lb 0.3 oz)   02/20/23 88.5 kg (195 lb)   11/22/22 90.3 kg (199 lb)    09/21/22 89.7 kg (197 lb 12 oz)     There is no height or weight on file to calculate BMI.     GENERAL: alert, cooperative, pleasant, in no acute distress  SKIN: warm and dry  NECK: Normal JVD, negative HJR  CARDIAC: Regular rate and rhythm with no rubs, murmurs, or gallops  CHEST: Normal respiratory efforts, lungs clear to auscultation bilaterally.  ABDOMEN: soft, nontender, nondistended  EXTREMITIES: no edema, +2 palpable RP and PT pulses bilaterally       Last Labs:  Recent Labs     08/30/23  1454 02/18/23  1322 02/01/23  0700   WBC 8.9 8.1 11.7*   HGB 11.7* 10.0* 7.9*   HCT 36.7* 31.3* 23.2*    478* 121*   MCV 97 99 96     Recent Labs     08/30/23  1454 08/16/23  1020 02/18/23  1322    141 139   K 3.7 3.9 3.3*    103 96*   BUN 21 18 14   CREATININE 1.04 1.09 0.97     CMP -  Lab Results   Component Value Date    CALCIUM 9.7 08/30/2023    PROT 7.0 08/30/2023    ALBUMIN 4.4 08/30/2023    AST 17 08/30/2023    ALT 18 08/30/2023    ALKPHOS 88 08/30/2023    BILITOT 0.5 08/30/2023       LIPID PANEL -   Lab Results   Component Value Date    CHOL 109 08/30/2023    HDL 44.2 08/30/2023    LDLF 35 08/30/2023    TRIG 147 08/30/2023       Lab Results   Component Value Date    HGBA1C 5.8 (A) 09/13/2022       Last Cardiology Tests:  ECG:  Obtained and reviewed EKG- normal sinus rhythm HR 62, T wave abnormality consider inferior ischemia    Echo:  Echo Results:  9/12/22   CONCLUSIONS:   1. Left ventricular systolic function is normal with a 60-65% estimated ejection fraction.   2. The left atrium is severely dilated.   3. There is moderate mitral annular calcification.                Assessment/Plan   Diagnoses and all orders for this visit:  Paroxysmal atrial fibrillation (CMS/HCC)  -     ECG 12 lead (Clinic Performed)  -     CBC; Future  -     Basic metabolic panel; Future  Coronary arteriosclerosis  Hypercholesterolemia  Benign essential hypertension    In summary, Mr. Huber is a pleasant 73 year-old  white male with past medical history significant for hypertension, mild left ventricular hypertrophy, hyperlipidemia, obstructive sleep apnea, family history of premature coronary artery disease, and paroxysmal atrial fibrillation and mild nonobstructive coronary artery disease on angiography. He is stable from cardiovascular perspective. His blood pressure is controlled. His recent lipid panel is at goal. I have ordered blood work as above to be done in February as he is on anticoagulation. I have encouraged him to modify diet and increase physical activity as able. Recommended Mediterranean diet. Continue current cardiovascular medications. Follow up in May as scheduled with Dr. Perrin.     Orders:  No orders of the defined types were placed in this encounter.     Followup Appts:  Future Appointments   Date Time Provider Department Center   1/23/2024  2:00 PM Keira Bradley MD [id number] [facility]   5/7/2024  1:30 PM Flavio Stevens MD [id number] [facility]   5/28/2024  2:20 PM Rodriguez Perrin MD [id number] [facility]   9/9/2024  1:20 PM Pedro Tesfaye MD [id number] [facility]           ____________________________________________________________  JACOB Marks-CNP  [facility]  [facility]

## 2023-11-30 LAB
ATRIAL RATE: 62 BPM
P AXIS: 25 DEGREES
P OFFSET: 201 MS
P ONSET: 130 MS
PR INTERVAL: 176 MS
Q ONSET: 218 MS
QRS COUNT: 10 BEATS
QRS DURATION: 90 MS
QT INTERVAL: 452 MS
QTC CALCULATION(BAZETT): 458 MS
QTC FREDERICIA: 457 MS
R AXIS: 27 DEGREES
T AXIS: -14 DEGREES
T OFFSET: 444 MS
VENTRICULAR RATE: 62 BPM

## 2023-12-01 DIAGNOSIS — I25.10 CORONARY ARTERIOSCLEROSIS: Primary | ICD-10-CM

## 2023-12-04 RX ORDER — APIXABAN 5 MG/1
5 TABLET, FILM COATED ORAL 2 TIMES DAILY
Qty: 180 TABLET | Refills: 3 | Status: SHIPPED | OUTPATIENT
Start: 2023-12-04 | End: 2023-12-11 | Stop reason: SDUPTHER

## 2023-12-06 ENCOUNTER — OFFICE VISIT (OUTPATIENT)
Dept: OPHTHALMOLOGY | Facility: HOSPITAL | Age: 73
End: 2023-12-06
Payer: MEDICARE

## 2023-12-06 DIAGNOSIS — H53.2 DIPLOPIA: Primary | ICD-10-CM

## 2023-12-06 PROCEDURE — 99213 OFFICE O/P EST LOW 20 MIN: CPT | Performed by: OPHTHALMOLOGY

## 2023-12-06 ASSESSMENT — REFRACTION_WEARINGRX
OD_VPRISM: 3
OD_SPHERE: -1.25
OD_AXIS: 008
OD_HPRISM: 6
OD_HBASE: BASE IN
OS_AXIS: 180
OD_CYLINDER: +1.25
OS_CYLINDER: +1.25
OD_VBASE: BASE DOWN
OS_SPHERE: -2.00
OD_ADD: +3.00
SPECS_TYPE: BIFOCAL
OS_ADD: +3.00

## 2023-12-06 ASSESSMENT — VISUAL ACUITY
OS_CC: 20/20-1
METHOD: SNELLEN - LINEAR
OD_CC: 20/20-2
CORRECTION_TYPE: GLASSES

## 2023-12-06 ASSESSMENT — REFRACTION_FINALRX
OD_HPRISM: 3
OD_VPRISM: 2
OS_HBASE: BASE IN
OD_HBASE: BASE IN
OS_HPRISM: 3
OS_VPRISM: 1

## 2023-12-06 ASSESSMENT — EXTERNAL EXAM - RIGHT EYE: OD_EXAM: NORMAL

## 2023-12-06 ASSESSMENT — ENCOUNTER SYMPTOMS
CONSTITUTIONAL NEGATIVE: 1
CARDIOVASCULAR NEGATIVE: 1
NEUROLOGICAL NEGATIVE: 1
ENDOCRINE NEGATIVE: 1

## 2023-12-06 ASSESSMENT — EXTERNAL EXAM - LEFT EYE: OS_EXAM: NORMAL

## 2023-12-06 ASSESSMENT — CUP TO DISC RATIO
OS_RATIO: 0.4
OD_RATIO: 0.3

## 2023-12-06 ASSESSMENT — SLIT LAMP EXAM - LIDS
COMMENTS: NORMAL
COMMENTS: NORMAL

## 2023-12-06 NOTE — PROGRESS NOTES
Patient with stable misalignment and binocular vs monocular diplopia     Divided the prism into both eyes today that might be the reason patient is uncomfortable.     Follow up prn

## 2023-12-11 DIAGNOSIS — I48.0 PAROXYSMAL ATRIAL FIBRILLATION (MULTI): Primary | ICD-10-CM

## 2023-12-11 DIAGNOSIS — I25.10 CORONARY ARTERIOSCLEROSIS: ICD-10-CM

## 2024-01-03 DIAGNOSIS — I10 BENIGN ESSENTIAL HYPERTENSION: ICD-10-CM

## 2024-01-03 DIAGNOSIS — I48.0 PAROXYSMAL ATRIAL FIBRILLATION (MULTI): ICD-10-CM

## 2024-01-03 DIAGNOSIS — E78.00 HYPERCHOLESTEROLEMIA: ICD-10-CM

## 2024-01-03 DIAGNOSIS — I25.10 CORONARY ARTERIOSCLEROSIS: ICD-10-CM

## 2024-01-04 RX ORDER — OMEPRAZOLE 40 MG/1
40 CAPSULE, DELAYED RELEASE ORAL
Qty: 180 CAPSULE | Refills: 3 | Status: SHIPPED | OUTPATIENT
Start: 2024-01-04 | End: 2025-01-03

## 2024-01-04 RX ORDER — METOPROLOL SUCCINATE 50 MG/1
150 TABLET, EXTENDED RELEASE ORAL DAILY
Qty: 270 TABLET | Refills: 3 | Status: SHIPPED | OUTPATIENT
Start: 2024-01-04 | End: 2025-01-03

## 2024-01-04 RX ORDER — CHLORTHALIDONE 25 MG/1
12.5 TABLET ORAL DAILY
Qty: 45 TABLET | Refills: 3 | Status: SHIPPED | OUTPATIENT
Start: 2024-01-04 | End: 2025-01-03

## 2024-01-04 RX ORDER — ATORVASTATIN CALCIUM 80 MG/1
80 TABLET, FILM COATED ORAL DAILY
Qty: 90 TABLET | Refills: 3 | Status: SHIPPED | OUTPATIENT
Start: 2024-01-04 | End: 2025-01-03

## 2024-01-05 DIAGNOSIS — N40.1 BPH WITH OBSTRUCTION/LOWER URINARY TRACT SYMPTOMS: Primary | ICD-10-CM

## 2024-01-05 DIAGNOSIS — N13.8 BPH WITH OBSTRUCTION/LOWER URINARY TRACT SYMPTOMS: Primary | ICD-10-CM

## 2024-01-06 RX ORDER — TAMSULOSIN HYDROCHLORIDE 0.4 MG/1
0.4 CAPSULE ORAL DAILY
Qty: 90 CAPSULE | Refills: 0 | Status: SHIPPED | OUTPATIENT
Start: 2024-01-06 | End: 2024-02-14 | Stop reason: SDUPTHER

## 2024-01-23 ENCOUNTER — APPOINTMENT (OUTPATIENT)
Dept: UROLOGY | Facility: CLINIC | Age: 74
End: 2024-01-23
Payer: MEDICARE

## 2024-02-14 ENCOUNTER — OFFICE VISIT (OUTPATIENT)
Dept: UROLOGY | Facility: CLINIC | Age: 74
End: 2024-02-14
Payer: MEDICARE

## 2024-02-14 VITALS — WEIGHT: 198 LBS | HEIGHT: 64 IN | BODY MASS INDEX: 33.8 KG/M2 | TEMPERATURE: 97.1 F

## 2024-02-14 DIAGNOSIS — N52.9 MALE ERECTILE DISORDER: ICD-10-CM

## 2024-02-14 DIAGNOSIS — N40.1 BPH WITH OBSTRUCTION/LOWER URINARY TRACT SYMPTOMS: Primary | ICD-10-CM

## 2024-02-14 DIAGNOSIS — N13.8 BPH WITH OBSTRUCTION/LOWER URINARY TRACT SYMPTOMS: Primary | ICD-10-CM

## 2024-02-14 PROCEDURE — 1126F AMNT PAIN NOTED NONE PRSNT: CPT | Performed by: UROLOGY

## 2024-02-14 PROCEDURE — 99214 OFFICE O/P EST MOD 30 MIN: CPT | Performed by: UROLOGY

## 2024-02-14 PROCEDURE — 1159F MED LIST DOCD IN RCRD: CPT | Performed by: UROLOGY

## 2024-02-14 PROCEDURE — 51798 US URINE CAPACITY MEASURE: CPT | Performed by: UROLOGY

## 2024-02-14 PROCEDURE — 1157F ADVNC CARE PLAN IN RCRD: CPT | Performed by: UROLOGY

## 2024-02-14 PROCEDURE — 1036F TOBACCO NON-USER: CPT | Performed by: UROLOGY

## 2024-02-14 RX ORDER — TAMSULOSIN HYDROCHLORIDE 0.4 MG/1
0.4 CAPSULE ORAL DAILY
Qty: 90 CAPSULE | Refills: 0 | Status: SHIPPED | OUTPATIENT
Start: 2024-02-14 | End: 2024-05-14

## 2024-02-14 RX ORDER — SILDENAFIL 50 MG/1
50 TABLET, FILM COATED ORAL AS NEEDED
Qty: 30 TABLET | Refills: 6 | Status: SHIPPED | OUTPATIENT
Start: 2024-02-14 | End: 2025-02-13

## 2024-02-14 ASSESSMENT — PAIN SCALES - GENERAL: PAINLEVEL: 0-NO PAIN

## 2024-02-14 NOTE — PROGRESS NOTES
Subjective   Matt Huber is a 73 y.o. male with a history of BPH with LUTS managed on Tamsulosin and ED on Sildenafil, presenting today for a follow up visit. Patient reports his urinary symptoms are stable, however, he is interested in a definitive outlet procedure. He has occasional weak stream and nocturia x2. He states he has had good response with Sildenafil. Patient denies any gross hematuria, dysuria, nausea, vomiting, fevers or chills.         Past Medical History:   Diagnosis Date    A-fib (CMS/[facility])     Acute bronchospasm 02/12/2019    Cough due to bronchospasm    Amblyopia of eye, right     Cardiomegaly 08/27/2013    Left ventricular hypertrophy    Cataract     Displaced fracture of coronoid process of left ulna, initial encounter for closed fracture 04/22/2015    Fracture of coronoid process of left ulna    Exotropia of right eye     Hypermetropia of both eyes     Hypertension     Medial epicondylitis, left elbow 04/13/2015    Medial epicondylitis of left elbow    Monocular diplopia     Other chondrocalcinosis, right knee 08/05/2015    Chondrocalcinosis of knee, right    Other enthesopathies, not elsewhere classified 03/31/2014    Tendonitis of shoulder    Other enthesopathies, not elsewhere classified 05/22/2015    Tendonitis of elbow, left    Pain in left knee 01/10/2020    Left knee pain    Pain in unspecified hip 02/24/2016    Hip pain    Pain in unspecified shoulder 03/18/2014    Shoulder pain    Paroxysmal atrial fibrillation (CMS/[facility]) 11/22/2022    Paroxysmal atrial fibrillation    Personal history of other diseases of the circulatory system 01/31/2014    History of mitral valve insufficiency    Personal history of other diseases of the musculoskeletal system and connective tissue 12/30/2014    History of trigger finger    Presbyopia of both eyes     Strabismus     Type 2 macular telangiectasis     Unilateral primary osteoarthritis, left knee 01/21/2019    Primary localized osteoarthrosis of  left lower leg    Unspecified acute lower respiratory infection 06/10/2016    Acute lower respiratory tract infection    Unspecified injury of left wrist, hand and finger(s), initial encounter 05/22/2015    Left wrist injury    Unspecified injury of unspecified elbow, initial encounter 05/22/2015    Elbow injury     Past Surgical History:   Procedure Laterality Date    BACK SURGERY  02/04/2014    Back Surgery    CATARACT EXTRACTION W/  INTRAOCULAR LENS IMPLANT Left 09/13/2023    Dr Tsang    CATARACT EXTRACTION W/  INTRAOCULAR LENS IMPLANT Right 08/22/2023    Dr Tsang    KNEE SURGERY  02/04/2014    Knee Surgery Right    KNEE SURGERY  02/04/2014    Knee Surgery Left    MR HEAD ANGIO WO IV CONTRAST  01/06/2016    MR HEAD ANGIO WO IV CONTRAST 1/6/2016 [facility]    MR NECK ANGIO WO IV CONTRAST  01/06/2016    MR NECK ANGIO WO IV CONTRAST 1/6/2016 [facility]    OTHER SURGICAL HISTORY  08/19/2020    Strabismus surgery    OTHER SURGICAL HISTORY  08/19/2020    Knee replacement    OTHER SURGICAL HISTORY  02/04/2014    Wrist Carpectomy    SPINE SURGERY  01/2023    1.  L1/L2-L5/S1 bilateral laminectomy facetectomy and foraminotomies  2.  L5-S1 transforaminal lumbar interbody fusion with titanium interbody cage,  local bone graft and allograft chips  3.  T4 to ilium thoracolumbar fusion with Solera instrumentation, local bone  graft, allograft chips, demineralized bone matrix, and bone marrow aspirate 4.  Cement augmentation T4-T5 with Kyphon cement    TOTAL KNEE ARTHROPLASTY  08/09/2017    Total Knee Arthroplasty     Family History   Problem Relation Name Age of Onset    Heart failure Mother      Liver disease Mother      Coronary artery disease Father      Heart attack Father      Colon cancer Brother      Heart attack Brother       Current Outpatient Medications   Medication Sig Dispense Refill    acetaminophen (Tylenol) 500 mg tablet Take 2 tablets (1,000 mg) by mouth 2 times a day.      apixaban  (Eliquis) 5 mg tablet Take 1 tablet (5 mg) by mouth 2 times a day. 180 tablet 3    atorvastatin (Lipitor) 80 mg tablet Take 1 tablet (80 mg) by mouth once daily. 90 tablet 3    buPROPion XL (Wellbutrin XL) 150 mg 24 hr tablet Take 2 tablets (300 mg) by mouth once daily.      chlorthalidone (Hygroton) 25 mg tablet Take 0.5 tablets (12.5 mg) by mouth once daily. 45 tablet 3    flecainide (Tambocor) 100 mg tablet Take 3 tablets (300 mg) by mouth 1 time if needed.      gabapentin (Neurontin) 300 mg capsule Take 1 capsule (300 mg) by mouth once daily at bedtime.      levothyroxine (Synthroid, Levoxyl) 75 mcg tablet TAKE 1 TABLET DAILY AND 2 TABLETS ON SUNDAYS 103 tablet 3    melatonin 2.5 mg tablet,chewable Chew 1 tablet (2.5 mg) once daily.      metoprolol succinate XL (Toprol-XL) 50 mg 24 hr tablet Take 3 tablets (150 mg) by mouth once daily. 270 tablet 3    multivitamin-children's (Cerovite, Jr) chewable tablet Chew 1 tablet once daily.      omega-3 fatty acids-fish oil (Fish OiL) 340-1,000 mg capsule Take by mouth twice a day.      omeprazole (PriLOSEC) 40 mg DR capsule Take 1 capsule (40 mg) by mouth 2 times a day before meals. 180 capsule 3    sildenafil (Viagra) 50 mg tablet Take 1 tablet (50 mg) by mouth if needed for erectile dysfunction. 30 tablet 6    tamsulosin (Flomax) 0.4 mg 24 hr capsule Take 1 capsule (0.4 mg) by mouth once daily. 90 capsule 0     No current facility-administered medications for this visit.     Allergies   Allergen Reactions    Ace Inhibitors Cough and Other     severe cough    Codeine Nausea Only     nausea    Sulfa (Sulfonamide Antibiotics) Other     Social History     Socioeconomic History    Marital status:      Spouse name: Not on file    Number of children: Not on file    Years of education: Not on file    Highest education level: Not on file   Occupational History    Not on file   Tobacco Use    Smoking status: Former     Types: Cigarettes     Quit date: 1976     Years since  quittin.1    Smokeless tobacco: Never   Vaping Use    Vaping Use: Never used   Substance and Sexual Activity    Alcohol use: Yes     Comment: <1/week    Drug use: Not on file    Sexual activity: Not on file   Other Topics Concern    Not on file   Social History Narrative    Not on file     Social Determinants of Health     Financial Resource Strain: Not on file   Food Insecurity: Not on file   Transportation Needs: Not on file   Physical Activity: Not on file   Stress: Not on file   Social Connections: Not on file   Intimate Partner Violence: Not on file   Housing Stability: Not on file       Review of Systems  Pertinent items are noted in HPI.    Objective       Lab Review  Lab Results   Component Value Date    WBC 8.9 2023    RBC 3.80 (L) 2023    HGB 11.7 (L) 2023    HCT 36.7 (L) 2023     2023      Lab Results   Component Value Date    BUN 21 2023    CREATININE 1.04 2023      Lab Results   Component Value Date    PSA 1.16 10/22/2018     PVR 0 ML   IPSS 11 and 3       Assessment/Plan   Diagnoses and all orders for this visit:  BPH with obstruction/lower urinary tract symptoms  -     Measure post void residual  -     MR prostate with sha boundaries; Future  -     tamsulosin (Flomax) 0.4 mg 24 hr capsule; Take 1 capsule (0.4 mg) by mouth once daily.  Male erectile disorder  -     sildenafil (Viagra) 50 mg tablet; Take 1 tablet (50 mg) by mouth if needed for erectile dysfunction.    BPH with LUTS on Tamsulosin     Patient reports his urinary symptoms are stable, however, he is interested in a definitive outlet procedure. He has occasional weak stream and nocturia x2.    Last PSA was 0.96 (2023), stable.     We will obtain a prostate MRI to assess prostate size and r/o prostate cancer.     I discussed with the patient that the prostate MRI has a high sensitivity for high-grade prostate cancer lesions but a lower sensitivity for low to intermediate prostate  cancer lesions. We discussed that currently MRI is not considered to be a standard of care for prostate cancer screening, therefore self-pay option is available.    We will refill Tamsulosin.     Follow up virtually to review MRI.     Erectile dysfunction - good response with Sildenafil 50mg. We will refill this.      All questions were answered to the patient's satisfaction. Patient agrees with the plan and wishes to proceed. Follow-up will be scheduled appropriately.     Scribed for Dr. Bradley by Nancy Sinclair. I , Dr Bradley, have personally reviewed and agreed with the information entered by the Virtual Scribe.

## 2024-02-15 DIAGNOSIS — E03.9 HYPOTHYROIDISM, UNSPECIFIED TYPE: ICD-10-CM

## 2024-02-15 RX ORDER — LEVOTHYROXINE SODIUM 75 UG/1
TABLET ORAL
Qty: 103 TABLET | Refills: 3 | Status: SHIPPED | OUTPATIENT
Start: 2024-02-15

## 2024-02-29 DIAGNOSIS — M54.12 RADICULOPATHY, CERVICAL REGION: ICD-10-CM

## 2024-03-04 RX ORDER — GABAPENTIN 300 MG/1
CAPSULE ORAL
Qty: 60 CAPSULE | Refills: 5 | Status: SHIPPED | OUTPATIENT
Start: 2024-03-04

## 2024-03-08 ENCOUNTER — HOSPITAL ENCOUNTER (OUTPATIENT)
Dept: RADIOLOGY | Facility: HOSPITAL | Age: 74
Discharge: HOME | End: 2024-03-08
Payer: MEDICARE

## 2024-03-08 DIAGNOSIS — N13.8 BPH WITH OBSTRUCTION/LOWER URINARY TRACT SYMPTOMS: ICD-10-CM

## 2024-03-08 DIAGNOSIS — N40.1 BPH WITH OBSTRUCTION/LOWER URINARY TRACT SYMPTOMS: ICD-10-CM

## 2024-03-08 PROCEDURE — 72197 MRI PELVIS W/O & W/DYE: CPT | Performed by: RADIOLOGY

## 2024-03-08 PROCEDURE — 72197 MRI PELVIS W/O & W/DYE: CPT

## 2024-03-08 PROCEDURE — 2550000001 HC RX 255 CONTRASTS

## 2024-03-08 PROCEDURE — A9575 INJ GADOTERATE MEGLUMI 0.1ML: HCPCS

## 2024-03-08 RX ORDER — GADOTERATE MEGLUMINE 376.9 MG/ML
19 INJECTION INTRAVENOUS
Status: COMPLETED | OUTPATIENT
Start: 2024-03-08 | End: 2024-03-08

## 2024-03-08 RX ADMIN — GADOTERATE MEGLUMINE 19 ML: 376.9 INJECTION INTRAVENOUS at 16:42

## 2024-03-13 ENCOUNTER — TELEMEDICINE (OUTPATIENT)
Dept: UROLOGY | Facility: CLINIC | Age: 74
End: 2024-03-13
Payer: MEDICARE

## 2024-03-13 DIAGNOSIS — N13.8 BPH WITH OBSTRUCTION/LOWER URINARY TRACT SYMPTOMS: ICD-10-CM

## 2024-03-13 DIAGNOSIS — N40.1 BPH WITH OBSTRUCTION/LOWER URINARY TRACT SYMPTOMS: ICD-10-CM

## 2024-03-13 PROCEDURE — 99214 OFFICE O/P EST MOD 30 MIN: CPT | Performed by: UROLOGY

## 2024-03-13 PROCEDURE — 1157F ADVNC CARE PLAN IN RCRD: CPT | Performed by: UROLOGY

## 2024-03-13 PROCEDURE — 1159F MED LIST DOCD IN RCRD: CPT | Performed by: UROLOGY

## 2024-03-13 PROCEDURE — G2211 COMPLEX E/M VISIT ADD ON: HCPCS | Performed by: UROLOGY

## 2024-03-13 PROCEDURE — 1036F TOBACCO NON-USER: CPT | Performed by: UROLOGY

## 2024-03-13 NOTE — PROGRESS NOTES
Subjective   This visit was completed via telemedicine. All issues as below were discussed and addressed but no physical exam was performed unless allowed by visual confirmation. If it was felt that the patient should be evaluated in clinic, then they were directed there. Patient verbally consented to visit.    Matt Huber is a 73 y.o. male  with a history of BPH with LUTS managed on Tamsulosin and ED on Sildenafil, presenting today for a follow up visit to review prostate MRI.         Past Medical History:   Diagnosis Date    A-fib (CMS/[facility])     Acute bronchospasm 02/12/2019    Cough due to bronchospasm    Amblyopia of eye, right     Cardiomegaly 08/27/2013    Left ventricular hypertrophy    Cataract     Displaced fracture of coronoid process of left ulna, initial encounter for closed fracture 04/22/2015    Fracture of coronoid process of left ulna    Exotropia of right eye     Hypermetropia of both eyes     Hypertension     Medial epicondylitis, left elbow 04/13/2015    Medial epicondylitis of left elbow    Monocular diplopia     Other chondrocalcinosis, right knee 08/05/2015    Chondrocalcinosis of knee, right    Other enthesopathies, not elsewhere classified 03/31/2014    Tendonitis of shoulder    Other enthesopathies, not elsewhere classified 05/22/2015    Tendonitis of elbow, left    Pain in left knee 01/10/2020    Left knee pain    Pain in unspecified hip 02/24/2016    Hip pain    Pain in unspecified shoulder 03/18/2014    Shoulder pain    Paroxysmal atrial fibrillation (CMS/[facility]) 11/22/2022    Paroxysmal atrial fibrillation    Personal history of other diseases of the circulatory system 01/31/2014    History of mitral valve insufficiency    Personal history of other diseases of the musculoskeletal system and connective tissue 12/30/2014    History of trigger finger    Presbyopia of both eyes     Strabismus     Type 2 macular telangiectasis     Unilateral primary osteoarthritis, left knee 01/21/2019     Primary localized osteoarthrosis of left lower leg    Unspecified acute lower respiratory infection 06/10/2016    Acute lower respiratory tract infection    Unspecified injury of left wrist, hand and finger(s), initial encounter 05/22/2015    Left wrist injury    Unspecified injury of unspecified elbow, initial encounter 05/22/2015    Elbow injury     Past Surgical History:   Procedure Laterality Date    BACK SURGERY  02/04/2014    Back Surgery    CATARACT EXTRACTION W/  INTRAOCULAR LENS IMPLANT Left 09/13/2023    Dr Tsang    CATARACT EXTRACTION W/  INTRAOCULAR LENS IMPLANT Right 08/22/2023    Dr Tsang    KNEE SURGERY  02/04/2014    Knee Surgery Right    KNEE SURGERY  02/04/2014    Knee Surgery Left    MR HEAD ANGIO WO IV CONTRAST  01/06/2016    MR HEAD ANGIO WO IV CONTRAST 1/6/2016 [facility]    MR NECK ANGIO WO IV CONTRAST  01/06/2016    MR NECK ANGIO WO IV CONTRAST 1/6/2016 [facility]    OTHER SURGICAL HISTORY  08/19/2020    Strabismus surgery    OTHER SURGICAL HISTORY  08/19/2020    Knee replacement    OTHER SURGICAL HISTORY  02/04/2014    Wrist Carpectomy    SPINE SURGERY  01/2023    1.  L1/L2-L5/S1 bilateral laminectomy facetectomy and foraminotomies  2.  L5-S1 transforaminal lumbar interbody fusion with titanium interbody cage,  local bone graft and allograft chips  3.  T4 to ilium thoracolumbar fusion with Solera instrumentation, local bone  graft, allograft chips, demineralized bone matrix, and bone marrow aspirate 4.  Cement augmentation T4-T5 with Kyphon cement    TOTAL KNEE ARTHROPLASTY  08/09/2017    Total Knee Arthroplasty     Family History   Problem Relation Name Age of Onset    Heart failure Mother      Liver disease Mother      Coronary artery disease Father      Heart attack Father      Colon cancer Brother      Heart attack Brother       Current Outpatient Medications   Medication Sig Dispense Refill    acetaminophen (Tylenol) 500 mg tablet Take 2 tablets (1,000 mg) by mouth  2 times a day.      apixaban (Eliquis) 5 mg tablet Take 1 tablet (5 mg) by mouth 2 times a day. 180 tablet 3    atorvastatin (Lipitor) 80 mg tablet Take 1 tablet (80 mg) by mouth once daily. 90 tablet 3    buPROPion XL (Wellbutrin XL) 150 mg 24 hr tablet Take 2 tablets (300 mg) by mouth once daily.      chlorthalidone (Hygroton) 25 mg tablet Take 0.5 tablets (12.5 mg) by mouth once daily. 45 tablet 3    flecainide (Tambocor) 100 mg tablet Take 3 tablets (300 mg) by mouth 1 time if needed.      gabapentin (Neurontin) 300 mg capsule TAKE 1 CAPSULE DAILY AT BEDTIME IF TOLERATING WELL MAY INCREASE TO 2 CAPSULE DAILY AT BEDTIME 60 capsule 5    levothyroxine (Synthroid, Levoxyl) 75 mcg tablet TAKE 1 TABLET DAILY AND 2 TABLETS ON SUNDAYS 103 tablet 3    melatonin 2.5 mg tablet,chewable Chew 1 tablet (2.5 mg) once daily.      metoprolol succinate XL (Toprol-XL) 50 mg 24 hr tablet Take 3 tablets (150 mg) by mouth once daily. 270 tablet 3    multivitamin-children's (Cerovite, Jr) chewable tablet Chew 1 tablet once daily.      omega-3 fatty acids-fish oil (Fish OiL) 340-1,000 mg capsule Take by mouth twice a day.      omeprazole (PriLOSEC) 40 mg DR capsule Take 1 capsule (40 mg) by mouth 2 times a day before meals. 180 capsule 3    sildenafil (Viagra) 50 mg tablet Take 1 tablet (50 mg) by mouth if needed for erectile dysfunction. 30 tablet 6    tamsulosin (Flomax) 0.4 mg 24 hr capsule Take 1 capsule (0.4 mg) by mouth once daily. 90 capsule 0     No current facility-administered medications for this visit.     Allergies   Allergen Reactions    Ace Inhibitors Cough and Other     severe cough    Codeine Nausea Only     nausea    Sulfa (Sulfonamide Antibiotics) Other     Social History     Socioeconomic History    Marital status:      Spouse name: Not on file    Number of children: Not on file    Years of education: Not on file    Highest education level: Not on file   Occupational History    Not on file   Tobacco Use     Smoking status: Former     Types: Cigarettes     Quit date:      Years since quittin.2    Smokeless tobacco: Never   Vaping Use    Vaping Use: Never used   Substance and Sexual Activity    Alcohol use: Yes     Comment: <1/week    Drug use: Not on file    Sexual activity: Not on file   Other Topics Concern    Not on file   Social History Narrative    Not on file     Social Determinants of Health     Financial Resource Strain: Not on file   Food Insecurity: Not on file   Transportation Needs: Not on file   Physical Activity: Not on file   Stress: Not on file   Social Connections: Not on file   Intimate Partner Violence: Not on file   Housing Stability: Not on file       Review of Systems  Pertinent items are noted in HPI.    Objective     Lab Review  Lab Results   Component Value Date    WBC 8.9 2023    RBC 3.80 (L) 2023    HGB 11.7 (L) 2023    HCT 36.7 (L) 2023     2023      Lab Results   Component Value Date    BUN 21 2023    CREATININE 1.04 2023      Lab Results   Component Value Date    PSA 1.16 10/22/2018       Assessment/Plan   Diagnoses and all orders for this visit:  BPH with obstruction/lower urinary tract symptoms    BPH with LUTS     I reviewed prostate MRI from 3/8/2024 which shows a 31.2g prostate with no sign of cancer.       We discussed proceeding with cystoscopy in anticipation of an outlet procedure, however, patient would like to continue with medication at this time due to possibility of sexual side effects with HoLEP.     The risks of cystoscopy were discussed with the patient in great detail, including risk of hematuria, UTI and discomfort. Antibiotic will be prescribed to be taken 1 hour prior to the procedure.    All questions were answered to the patient's satisfaction. Patient agrees with the plan and wishes to proceed. Follow-up will be scheduled appropriately.     E&M visit today is associated with current or anticipated ongoing  medical care services related to a patient's single, serious condition or a complex condition.     Scribed for Dr. Bradley by Nancy Sinclair. I , Dr Bradley, have personally reviewed and agreed with the information entered by the Virtual Scribe.

## 2024-04-09 ENCOUNTER — TRANSCRIBE ORDERS (OUTPATIENT)
Dept: ORTHOPEDIC SURGERY | Facility: HOSPITAL | Age: 74
End: 2024-04-09
Payer: MEDICARE

## 2024-04-09 DIAGNOSIS — M48.062 LUMBAR STENOSIS WITH NEUROGENIC CLAUDICATION: ICD-10-CM

## 2024-04-19 ENCOUNTER — TRANSCRIBE ORDERS (OUTPATIENT)
Dept: ORTHOPEDIC SURGERY | Facility: HOSPITAL | Age: 74
End: 2024-04-19
Payer: MEDICARE

## 2024-04-19 DIAGNOSIS — M48.062 LUMBAR STENOSIS WITH NEUROGENIC CLAUDICATION: ICD-10-CM

## 2024-05-07 ENCOUNTER — OFFICE VISIT (OUTPATIENT)
Dept: OPHTHALMOLOGY | Facility: CLINIC | Age: 74
End: 2024-05-07
Payer: MEDICARE

## 2024-05-07 DIAGNOSIS — H53.2 MONOCULAR DIPLOPIA OF LEFT EYE: ICD-10-CM

## 2024-05-07 DIAGNOSIS — H53.001 AMBLYOPIA OF RIGHT EYE: ICD-10-CM

## 2024-05-07 DIAGNOSIS — H35.073 RETINAL TELANGIECTASIA OF BOTH EYES: Primary | ICD-10-CM

## 2024-05-07 DIAGNOSIS — H25.813 COMBINED FORMS OF AGE-RELATED CATARACT OF BOTH EYES: ICD-10-CM

## 2024-05-07 DIAGNOSIS — H35.073 TYPE 2 MACULAR TELANGIECTASIS OF BOTH EYES: ICD-10-CM

## 2024-05-07 PROCEDURE — 92134 CPTRZ OPH DX IMG PST SGM RTA: CPT | Performed by: OPHTHALMOLOGY

## 2024-05-07 PROCEDURE — 99214 OFFICE O/P EST MOD 30 MIN: CPT | Performed by: OPHTHALMOLOGY

## 2024-05-07 ASSESSMENT — CONF VISUAL FIELD
OS_INFERIOR_NASAL_RESTRICTION: 0
OS_SUPERIOR_NASAL_RESTRICTION: 0
OD_SUPERIOR_TEMPORAL_RESTRICTION: 0
OD_NORMAL: 1
OD_SUPERIOR_NASAL_RESTRICTION: 0
OD_INFERIOR_TEMPORAL_RESTRICTION: 0
OS_NORMAL: 1
OS_SUPERIOR_TEMPORAL_RESTRICTION: 0
OD_INFERIOR_NASAL_RESTRICTION: 0
OS_INFERIOR_TEMPORAL_RESTRICTION: 0

## 2024-05-07 ASSESSMENT — SLIT LAMP EXAM - LIDS
COMMENTS: NORMAL
COMMENTS: NORMAL

## 2024-05-07 ASSESSMENT — VISUAL ACUITY
METHOD: SNELLEN - LINEAR
CORRECTION_TYPE: GLASSES
OD_CC: 20/20
OS_CC: 20/25-2

## 2024-05-07 ASSESSMENT — CUP TO DISC RATIO
OD_RATIO: 0.3
OS_RATIO: 0.4

## 2024-05-07 ASSESSMENT — EXTERNAL EXAM - RIGHT EYE: OD_EXAM: NORMAL

## 2024-05-07 ASSESSMENT — TONOMETRY
OD_IOP_MMHG: 10
OS_IOP_MMHG: 10
IOP_METHOD: GOLDMANN APPLANATION

## 2024-05-07 ASSESSMENT — EXTERNAL EXAM - LEFT EYE: OS_EXAM: NORMAL

## 2024-05-07 ASSESSMENT — ENCOUNTER SYMPTOMS: CARDIOVASCULAR NEGATIVE: 1

## 2024-05-07 NOTE — PROGRESS NOTES
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, both eyes-Stable  7 H52.00 Hyperopia-Stable  8 H50.10 Exotropia of right eye-Stable  9 H53.2 Diplopia-Stable  10 H53.2 Monocular diplopia of left eye-Stable      Myopic retinoapthy  Amblopia right eye (OD)  Recent intraocular lens (IOL) PO doing well   Assessment/Plan   Diagnoses and all orders for this visit:  Myopia, bilateral  -     OCT, Retina - OU - Both Eyes  Retinal telangiectasia of both eyes  -     OCT, Retina - OU - Both Eyes  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

## 2024-05-13 NOTE — PROGRESS NOTES
Subjective    Patient ID: Matt Huber is a 73 y.o. male.    Chief Complaint: No chief complaint on file.     Last Surgery: No surgery found  Last Surgery Date: No surgery found    DOROTA Gutierrez is a 73 y.o. right hand dominant male presenting today for evaluation of their left shoulder.     3 years ago he believes he tore his bicep and he had bruising down to his elbow  It did not hurt so he left it alone. 1.5 years ago he had spine surgery with Dr. Fitzpatrick  As he was recovering from this, his shoulder began to start bothering him. His shoulder is to the point where it hurts after he has been looking at his phone for 5 minutes. He struggles with sleep at night.     Past medical history, surgical history, social history, and family history were all reviewed and are as per the [place] patient health history questionnaire form that I signed and scanned into the chart today.     Objective   Ortho Exam  Patient is a well-developed, well-nourished male in no acute distress.  Breathes with normal chest rises.  Pupils are round and symmetric today.  Awake, alert, and oriented x3.      Examination of the right shoulder today reveals the skin to be intact. There is no sign of any atrophy, lesions, or abrasions. There is no pain to palpation of the bony prominences. Cervical lymphadenopathy examined, and this was negative. Patient had 5 out of 5 wrist flexion, extension, and thumb extension, bilaterally. Sensation was intact to light touch to median, ulnar, radial, axillary, and musculocutaneous nerves bilaterally. Positive radial pulse bilaterally. Provocative maneuvers on the right side today were negative.  Range of motion of the right shoulder revealed 0-130° of forward elevation, 0-40° of external rotation, and internal rotation up to T-12.    Examination of the left shoulder today reveals the skin to be intact. There is no sign of any atrophy, lesions, or abrasions. Tender along the periscapular muscles. Cervical  lymphadenopathy examined, and this was negative. Patient had 5 out of 5 wrist flexion, extension, and thumb extension bilaterally. Sensation was intact to light touch to median, ulnar, radial axillary, and musculocutaneous nerves bilaterally. Positive radial pulse bilaterally. Mildly positive Hawkin's ign. Range of motion of the left shoulder revealed 0-150° of forward elevation, 0-50° of external rotation, and internal rotation was to T-12. 5/5 Rosemary's testing with pain. Wyatt deformity         Image Results:  05/16/24 X-rays personally ordered and interpreted by me show no signs of any fracture, dislocation, arthritis or proximal humerus migration.          Patient ID: Matt Huber is a 73 y.o. male.    L Inj/Asp: L subacromial bursa on 5/16/2024 9:51 AM  Indications: pain  Details: 20 G needle, anterior approach  Medications: 40 mg triamcinolone acetonide 40 mg/mL; 4 mL lidocaine 10 mg/mL (1 %)  Outcome: tolerated well, no immediate complications  Procedure, treatment alternatives, risks and benefits explained, specific risks discussed. Consent was given by the patient. Immediately prior to procedure a time out was called to verify the correct patient, procedure, equipment, support staff and site/side marked as required. Patient was prepped and draped in the usual sterile fashion.           Assessment/Plan   Encounter Diagnoses:  No diagnosis found.  Patient with left rotator cuff disease and likely partial bicep rupture    At this time, we had a very long discussion with the patient regarding the diagnosis. Rotator cuff disease is a spectrum of disorders ranging from rotator cuff tendinitis to full-thickness rotator cuff tears. We know that some patients over the age of 50 will have symptomatic rotator cuff tears just due to overuse and general wear and tear. In general, most patients who come in with complaints such as these will get better with therapy and injections alone. The therapy should be performed  2-3 times a day and should take about 10-15 minutes to perform each time.      We had a long discussion regarding his diagnosis. We talked about his treatment options. He was offered an injection today. He tolerated this well. He was also provided our at-home exercises. We will see him back in 3 months for consideration of a repeat injection.   No orders of the defined types were placed in this encounter.    Follow up in 3 months     Scribe Attestation  By signing my name below, IAmy Scribe   attest that this documentation has been prepared under the direction and in the presence of Leobardo Romero MD.

## 2024-05-16 ENCOUNTER — HOSPITAL ENCOUNTER (OUTPATIENT)
Dept: RADIOLOGY | Facility: HOSPITAL | Age: 74
Discharge: HOME | End: 2024-05-16
Payer: MEDICARE

## 2024-05-16 ENCOUNTER — OFFICE VISIT (OUTPATIENT)
Dept: ORTHOPEDIC SURGERY | Facility: HOSPITAL | Age: 74
End: 2024-05-16
Payer: MEDICARE

## 2024-05-16 DIAGNOSIS — M75.82 ROTATOR CUFF TENDINITIS, LEFT: Primary | ICD-10-CM

## 2024-05-16 DIAGNOSIS — M25.512 LEFT SHOULDER PAIN, UNSPECIFIED CHRONICITY: ICD-10-CM

## 2024-05-16 PROCEDURE — 99213 OFFICE O/P EST LOW 20 MIN: CPT | Performed by: ORTHOPAEDIC SURGERY

## 2024-05-16 PROCEDURE — 20610 DRAIN/INJ JOINT/BURSA W/O US: CPT | Mod: LT | Performed by: ORTHOPAEDIC SURGERY

## 2024-05-16 PROCEDURE — 2500000005 HC RX 250 GENERAL PHARMACY W/O HCPCS: Performed by: ORTHOPAEDIC SURGERY

## 2024-05-16 PROCEDURE — 1125F AMNT PAIN NOTED PAIN PRSNT: CPT | Performed by: ORTHOPAEDIC SURGERY

## 2024-05-16 PROCEDURE — 73030 X-RAY EXAM OF SHOULDER: CPT | Mod: LEFT SIDE | Performed by: STUDENT IN AN ORGANIZED HEALTH CARE EDUCATION/TRAINING PROGRAM

## 2024-05-16 PROCEDURE — 1036F TOBACCO NON-USER: CPT | Performed by: ORTHOPAEDIC SURGERY

## 2024-05-16 PROCEDURE — 1157F ADVNC CARE PLAN IN RCRD: CPT | Performed by: ORTHOPAEDIC SURGERY

## 2024-05-16 PROCEDURE — 1159F MED LIST DOCD IN RCRD: CPT | Performed by: ORTHOPAEDIC SURGERY

## 2024-05-16 PROCEDURE — 73030 X-RAY EXAM OF SHOULDER: CPT | Mod: LT

## 2024-05-16 PROCEDURE — 2500000004 HC RX 250 GENERAL PHARMACY W/ HCPCS (ALT 636 FOR OP/ED): Performed by: ORTHOPAEDIC SURGERY

## 2024-05-16 RX ADMIN — TRIAMCINOLONE ACETONIDE 40 MG: 400 INJECTION, SUSPENSION INTRA-ARTICULAR; INTRAMUSCULAR at 09:51

## 2024-05-16 RX ADMIN — LIDOCAINE HYDROCHLORIDE 4 ML: 10 INJECTION, SOLUTION INFILTRATION; PERINEURAL at 09:51

## 2024-05-16 ASSESSMENT — PAIN DESCRIPTION - DESCRIPTORS: DESCRIPTORS: DISCOMFORT

## 2024-05-16 ASSESSMENT — PAIN SCALES - GENERAL: PAINLEVEL_OUTOF10: 3

## 2024-05-16 ASSESSMENT — PAIN - FUNCTIONAL ASSESSMENT: PAIN_FUNCTIONAL_ASSESSMENT: 0-10

## 2024-05-22 ENCOUNTER — OFFICE VISIT (OUTPATIENT)
Dept: OPHTHALMOLOGY | Facility: CLINIC | Age: 74
End: 2024-05-22
Payer: MEDICARE

## 2024-05-22 DIAGNOSIS — H26.492 PCO (POSTERIOR CAPSULAR OPACIFICATION), LEFT: Primary | ICD-10-CM

## 2024-05-22 PROCEDURE — 66821 AFTER CATARACT LASER SURGERY: CPT | Mod: LEFT SIDE | Performed by: OPHTHALMOLOGY

## 2024-05-22 RX ORDER — LIDOCAINE HYDROCHLORIDE 10 MG/ML
4 INJECTION INFILTRATION; PERINEURAL
Status: COMPLETED | OUTPATIENT
Start: 2024-05-16 | End: 2024-05-16

## 2024-05-22 RX ORDER — TRIAMCINOLONE ACETONIDE 40 MG/ML
40 INJECTION, SUSPENSION INTRA-ARTICULAR; INTRAMUSCULAR
Status: COMPLETED | OUTPATIENT
Start: 2024-05-16 | End: 2024-05-16

## 2024-05-22 ASSESSMENT — EXTERNAL EXAM - RIGHT EYE: OD_EXAM: NORMAL

## 2024-05-22 ASSESSMENT — VISUAL ACUITY
OS_CC: 20/25
OD_CC: 20/20
METHOD: SNELLEN - LINEAR
OD_CC+: -2

## 2024-05-22 ASSESSMENT — REFRACTION_WEARINGRX
OS_CYLINDER: -1.25
OS_SPHERE: -0.75
OD_AXIS: 098
SPECS_TYPE: BIFOCAL
OS_ADD: +3.00
OD_CYLINDER: -1.25
OD_ADD: +3.00
OD_SPHERE: PLANO
OS_AXIS: 090

## 2024-05-22 ASSESSMENT — SLIT LAMP EXAM - LIDS
COMMENTS: NORMAL
COMMENTS: NORMAL

## 2024-05-22 ASSESSMENT — ENCOUNTER SYMPTOMS
GASTROINTESTINAL NEGATIVE: 0
MUSCULOSKELETAL NEGATIVE: 0
CARDIOVASCULAR NEGATIVE: 0
HEMATOLOGIC/LYMPHATIC NEGATIVE: 0
NEUROLOGICAL NEGATIVE: 0
RESPIRATORY NEGATIVE: 0
ENDOCRINE NEGATIVE: 0
PSYCHIATRIC NEGATIVE: 0
ALLERGIC/IMMUNOLOGIC NEGATIVE: 0
CONSTITUTIONAL NEGATIVE: 0
EYES NEGATIVE: 1

## 2024-05-22 ASSESSMENT — TONOMETRY
IOP_METHOD: GOLDMANN APPLANATION
OS_IOP_MMHG: 12
OD_IOP_MMHG: 10

## 2024-05-22 ASSESSMENT — EXTERNAL EXAM - LEFT EYE: OS_EXAM: NORMAL

## 2024-05-22 ASSESSMENT — REFRACTION_MANIFEST
OS_ADD: +3.00
OS_CYLINDER: -1.25
OS_AXIS: 090
OS_SPHERE: -0.75
OD_AXIS: 098
OD_CYLINDER: -1.25
OD_ADD: +3.00
OD_SPHERE: +0.00

## 2024-05-22 NOTE — PROGRESS NOTES
Assessment/Plan   Diagnoses and all orders for this visit:  PCO (posterior capsular opacification), left  -     YAG Capsulotomy - OS - Left Eye  Re check 2-3 weeks

## 2024-05-28 ENCOUNTER — APPOINTMENT (OUTPATIENT)
Dept: CARDIOLOGY | Facility: HOSPITAL | Age: 74
End: 2024-05-28
Payer: MEDICARE

## 2024-05-29 DIAGNOSIS — N13.8 BPH WITH OBSTRUCTION/LOWER URINARY TRACT SYMPTOMS: Primary | ICD-10-CM

## 2024-05-29 DIAGNOSIS — N40.1 BPH WITH OBSTRUCTION/LOWER URINARY TRACT SYMPTOMS: Primary | ICD-10-CM

## 2024-05-30 RX ORDER — TAMSULOSIN HYDROCHLORIDE 0.4 MG/1
0.4 CAPSULE ORAL DAILY
Qty: 90 CAPSULE | Refills: 3 | Status: SHIPPED | OUTPATIENT
Start: 2024-05-30

## 2024-06-05 ENCOUNTER — LAB (OUTPATIENT)
Dept: LAB | Facility: LAB | Age: 74
End: 2024-06-05
Payer: MEDICARE

## 2024-06-05 DIAGNOSIS — I48.0 PAROXYSMAL ATRIAL FIBRILLATION (MULTI): ICD-10-CM

## 2024-06-05 LAB
ANION GAP SERPL CALC-SCNC: 12 MMOL/L (ref 10–20)
BUN SERPL-MCNC: 16 MG/DL (ref 6–23)
CALCIUM SERPL-MCNC: 9.7 MG/DL (ref 8.6–10.6)
CHLORIDE SERPL-SCNC: 103 MMOL/L (ref 98–107)
CO2 SERPL-SCNC: 29 MMOL/L (ref 21–32)
CREAT SERPL-MCNC: 1.05 MG/DL (ref 0.5–1.3)
EGFRCR SERPLBLD CKD-EPI 2021: 75 ML/MIN/1.73M*2
ERYTHROCYTE [DISTWIDTH] IN BLOOD BY AUTOMATED COUNT: 12.5 % (ref 11.5–14.5)
GLUCOSE SERPL-MCNC: 116 MG/DL (ref 74–99)
HCT VFR BLD AUTO: 37.5 % (ref 41–52)
HGB BLD-MCNC: 12.6 G/DL (ref 13.5–17.5)
MCH RBC QN AUTO: 32.4 PG (ref 26–34)
MCHC RBC AUTO-ENTMCNC: 33.6 G/DL (ref 32–36)
MCV RBC AUTO: 96 FL (ref 80–100)
NRBC BLD-RTO: 0 /100 WBCS (ref 0–0)
PLATELET # BLD AUTO: 211 X10*3/UL (ref 150–450)
POTASSIUM SERPL-SCNC: 3.7 MMOL/L (ref 3.5–5.3)
RBC # BLD AUTO: 3.89 X10*6/UL (ref 4.5–5.9)
SODIUM SERPL-SCNC: 140 MMOL/L (ref 136–145)
WBC # BLD AUTO: 6.5 X10*3/UL (ref 4.4–11.3)

## 2024-06-05 PROCEDURE — 85027 COMPLETE CBC AUTOMATED: CPT

## 2024-06-05 PROCEDURE — 80048 BASIC METABOLIC PNL TOTAL CA: CPT

## 2024-06-05 PROCEDURE — 36415 COLL VENOUS BLD VENIPUNCTURE: CPT

## 2024-06-11 ENCOUNTER — OFFICE VISIT (OUTPATIENT)
Dept: OPHTHALMOLOGY | Facility: CLINIC | Age: 74
End: 2024-06-11
Payer: MEDICARE

## 2024-06-11 ENCOUNTER — TELEPHONE (OUTPATIENT)
Dept: CARDIOLOGY | Facility: HOSPITAL | Age: 74
End: 2024-06-11

## 2024-06-11 ENCOUNTER — OFFICE VISIT (OUTPATIENT)
Dept: CARDIOLOGY | Facility: HOSPITAL | Age: 74
End: 2024-06-11
Payer: MEDICARE

## 2024-06-11 VITALS
SYSTOLIC BLOOD PRESSURE: 118 MMHG | HEART RATE: 63 BPM | HEIGHT: 64 IN | BODY MASS INDEX: 35.51 KG/M2 | OXYGEN SATURATION: 97 % | DIASTOLIC BLOOD PRESSURE: 68 MMHG | WEIGHT: 208 LBS

## 2024-06-11 DIAGNOSIS — I25.10 CORONARY ARTERIOSCLEROSIS: ICD-10-CM

## 2024-06-11 DIAGNOSIS — E78.00 HYPERCHOLESTEROLEMIA: ICD-10-CM

## 2024-06-11 DIAGNOSIS — I48.0 PAROXYSMAL ATRIAL FIBRILLATION (MULTI): Primary | ICD-10-CM

## 2024-06-11 DIAGNOSIS — Z98.890 S/P YAG CAPSULOTOMY, LEFT: Primary | ICD-10-CM

## 2024-06-11 DIAGNOSIS — I10 BENIGN ESSENTIAL HYPERTENSION: ICD-10-CM

## 2024-06-11 PROCEDURE — 99214 OFFICE O/P EST MOD 30 MIN: CPT | Performed by: NURSE PRACTITIONER

## 2024-06-11 PROCEDURE — 1157F ADVNC CARE PLAN IN RCRD: CPT | Performed by: NURSE PRACTITIONER

## 2024-06-11 PROCEDURE — 3078F DIAST BP <80 MM HG: CPT | Performed by: NURSE PRACTITIONER

## 2024-06-11 PROCEDURE — 1036F TOBACCO NON-USER: CPT | Performed by: NURSE PRACTITIONER

## 2024-06-11 PROCEDURE — 1159F MED LIST DOCD IN RCRD: CPT | Performed by: NURSE PRACTITIONER

## 2024-06-11 PROCEDURE — 3074F SYST BP LT 130 MM HG: CPT | Performed by: NURSE PRACTITIONER

## 2024-06-11 PROCEDURE — G2211 COMPLEX E/M VISIT ADD ON: HCPCS | Performed by: NURSE PRACTITIONER

## 2024-06-11 PROCEDURE — 93005 ELECTROCARDIOGRAM TRACING: CPT | Performed by: NURSE PRACTITIONER

## 2024-06-11 ASSESSMENT — CONF VISUAL FIELD
OD_SUPERIOR_TEMPORAL_RESTRICTION: 0
OD_NORMAL: 1
OD_SUPERIOR_NASAL_RESTRICTION: 0
OS_INFERIOR_TEMPORAL_RESTRICTION: 0
OD_INFERIOR_TEMPORAL_RESTRICTION: 0
OS_SUPERIOR_NASAL_RESTRICTION: 0
OS_NORMAL: 1
OS_INFERIOR_NASAL_RESTRICTION: 0
OS_SUPERIOR_TEMPORAL_RESTRICTION: 0
OD_INFERIOR_NASAL_RESTRICTION: 0

## 2024-06-11 ASSESSMENT — VISUAL ACUITY
OS_PH_CC: 20/20
METHOD: SNELLEN - LINEAR
OD_SC: 20/20
OS_SC: 20/25
CORRECTION_TYPE: GLASSES

## 2024-06-11 ASSESSMENT — ENCOUNTER SYMPTOMS
RESPIRATORY NEGATIVE: 0
CONSTITUTIONAL NEGATIVE: 0
EYES NEGATIVE: 0
ALLERGIC/IMMUNOLOGIC NEGATIVE: 0
GASTROINTESTINAL NEGATIVE: 0
ENDOCRINE NEGATIVE: 0
HEMATOLOGIC/LYMPHATIC NEGATIVE: 0
NEUROLOGICAL NEGATIVE: 0
PSYCHIATRIC NEGATIVE: 0
CARDIOVASCULAR NEGATIVE: 0
MUSCULOSKELETAL NEGATIVE: 0

## 2024-06-11 ASSESSMENT — SLIT LAMP EXAM - LIDS
COMMENTS: NORMAL
COMMENTS: NORMAL

## 2024-06-11 ASSESSMENT — TONOMETRY
OD_IOP_MMHG: 12
OS_IOP_MMHG: 12
IOP_METHOD: GOLDMANN APPLANATION

## 2024-06-11 ASSESSMENT — EXTERNAL EXAM - RIGHT EYE: OD_EXAM: NORMAL

## 2024-06-11 ASSESSMENT — EXTERNAL EXAM - LEFT EYE: OS_EXAM: NORMAL

## 2024-06-11 NOTE — PROGRESS NOTES
Primary Care Physician: Pedro Tesfaye MD  Date of Visit: 06/11/2024  4:00 PM EDT  Location of visit: [facility]     Chief Complaint:   Chief Complaint   Patient presents with    Follow-up     6 month        HPI / Summary:   Matt Huber is a 73 y.o. male presents for followup. Seen in collaboration with Dr. Perrin. He has no particular complaints. He is undergoing physical therapy for his back and shoulder without chest pain or shortness of breath. He is able to ambulate up and down a flight of stairs without chest pain or dyspnea. He does have dyspnea ambulating up and down 2 flights of stairs. He has been walking 0.5 miles three days per week without symptoms. The patient denies chest pain, palpitations, lightheadedness, syncope, orthopnea, paroxysmal nocturnal dyspnea, lower extremity edema, or bleeding problems.              Past Medical History:  Past Medical History:   Diagnosis Date    A-fib (Multi)     Acute bronchospasm 02/12/2019    Cough due to bronchospasm    Amblyopia of eye, right     Cardiomegaly 08/27/2013    Left ventricular hypertrophy    Cataract     Displaced fracture of coronoid process of left ulna, initial encounter for closed fracture 04/22/2015    Fracture of coronoid process of left ulna    Exotropia of right eye     Hypermetropia of both eyes     Hypertension     Medial epicondylitis, left elbow 04/13/2015    Medial epicondylitis of left elbow    Monocular diplopia     Other chondrocalcinosis, right knee 08/05/2015    Chondrocalcinosis of knee, right    Other enthesopathies, not elsewhere classified 03/31/2014    Tendonitis of shoulder    Other enthesopathies, not elsewhere classified 05/22/2015    Tendonitis of elbow, left    Pain in left knee 01/10/2020    Left knee pain    Pain in unspecified hip 02/24/2016    Hip pain    Pain in unspecified shoulder 03/18/2014    Shoulder pain    Paroxysmal atrial fibrillation (Multi) 11/22/2022    Paroxysmal atrial fibrillation    Personal  history of other diseases of the circulatory system 01/31/2014    History of mitral valve insufficiency    Personal history of other diseases of the musculoskeletal system and connective tissue 12/30/2014    History of trigger finger    Presbyopia of both eyes     Strabismus     Type 2 macular telangiectasis     Unilateral primary osteoarthritis, left knee 01/21/2019    Primary localized osteoarthrosis of left lower leg    Unspecified acute lower respiratory infection 06/10/2016    Acute lower respiratory tract infection    Unspecified injury of left wrist, hand and finger(s), initial encounter 05/22/2015    Left wrist injury    Unspecified injury of unspecified elbow, initial encounter 05/22/2015    Elbow injury        Past Surgical History:  Past Surgical History:   Procedure Laterality Date    BACK SURGERY  02/04/2014    Back Surgery    CATARACT EXTRACTION W/  INTRAOCULAR LENS IMPLANT Left 09/13/2023    Dr Tsang    CATARACT EXTRACTION W/  INTRAOCULAR LENS IMPLANT Right 08/22/2023    Dr Tsang    KNEE SURGERY  02/04/2014    Knee Surgery Right    KNEE SURGERY  02/04/2014    Knee Surgery Left    MR HEAD ANGIO WO IV CONTRAST  01/06/2016    MR HEAD ANGIO WO IV CONTRAST 1/6/2016 [facility] [place] [facility]GENCY LEGAC[facility]    MR NECK ANGIO WO IV CONTRAST  01/06/2016    MR NECK ANGIO WO IV CONTRAST 1/6/2016 [facility] [place] EMERGENCY LEGACY    OTHER SURGICAL HISTORY  08/19/2020    Strabismus surgery    OTHER SURGICAL HISTORY  08/19/2020    Knee replacement    OTHER SURGICAL HISTORY  02/04/2014    Wrist Carpectomy    SPINE SURGERY  01/2023    1.  L1/L2-L5/S1 bilateral laminectomy facetectomy and foraminotomies  2.  L5-S1 transforaminal lumbar interbody fusion with titanium interbody cage,  local bone graft and allograft chips  3.  T4 to ilium thoracolumbar fusion with Solera instrumentation, local bone  graft, allograft chips, demineralized bone matrix, and bone marrow aspirate 4.  Cement augmentation T4-T5 with Kyphon cement    TOTAL KNEE ARTHROPLASTY   "08/09/2017    Total Knee Arthroplasty          Social History:   reports that he quit smoking about 48 years ago. His smoking use included cigarettes. He has never used smokeless tobacco. He reports current alcohol use.     Family History:  family history includes Colon cancer in his brother; Coronary artery disease in his father; Heart attack in his brother and father; Heart failure in his mother; Liver disease in his mother.      Allergies:  Allergies   Allergen Reactions    Ace Inhibitors Cough and Other     severe cough    Codeine Nausea Only     nausea    House Dust Cough    Pollen Extracts Cough    Sulfa (Sulfonamide Antibiotics) Nausea/vomiting       Outpatient Medications:  Current Outpatient Medications   Medication Instructions    acetaminophen (TYLENOL) 1,000 mg, oral, 2 times daily    apixaban (ELIQUIS) 5 mg, oral, 2 times daily    atorvastatin (LIPITOR) 80 mg, oral, Daily    buPROPion XL (Wellbutrin XL) 150 mg 24 hr tablet 2 tablets, oral, Daily    chlorthalidone (HYGROTON) 12.5 mg, oral, Daily    flecainide (TAMBOCOR) 300 mg, oral, Once as needed    gabapentin (Neurontin) 300 mg capsule TAKE 1 CAPSULE DAILY AT BEDTIME IF TOLERATING WELL MAY INCREASE TO 2 CAPSULE DAILY AT BEDTIME    levothyroxine (Synthroid, Levoxyl) 75 mcg tablet TAKE 1 TABLET DAILY AND 2 TABLETS ON SUNDAYS    metoprolol succinate XL (TOPROL-XL) 150 mg, oral, Daily    multivitamin-children's (Cerovite, Jr) chewable tablet 1 tablet, oral, Daily    omega-3 fatty acids-fish oil (Fish OiL) 340-1,000 mg capsule oral, 2 times daily    omeprazole (PRILOSEC) 40 mg, oral, 2 times daily before meals    sildenafil (VIAGRA) 50 mg, oral, As needed    tamsulosin (FLOMAX) 0.4 mg, oral, Daily       Physical Exam:  Vitals:    06/11/24 1604   BP: 135/85   BP Location: Right arm   Pulse: 63   SpO2: 97%   Weight: 94.3 kg (208 lb)   Height: 1.626 m (5' 4\")     Wt Readings from Last 5 Encounters:   06/11/24 94.3 kg (208 lb)   02/14/24 89.8 kg (198 lb) "   11/29/23 93 kg (205 lb)   08/30/23 88 kg (194 lb)   05/30/23 88.5 kg (195 lb 0.3 oz)     Body mass index is 35.7 kg/m².     GENERAL: alert, cooperative, pleasant, in no acute distress  SKIN: warm and dry  NECK: Normal JVD, negative HJR  CARDIAC: Regular rate and rhythm with no rubs, murmurs, or gallops  CHEST: Normal respiratory efforts, lungs clear to auscultation bilaterally.  ABDOMEN: soft, nontender, nondistended  EXTREMITIES: no edema, +2 palpable RP bilaterally       Last Labs:  Recent Labs     06/05/24  1325 08/30/23  1454 02/18/23  1322   WBC 6.5 8.9 8.1   HGB 12.6* 11.7* 10.0*   HCT 37.5* 36.7* 31.3*    231 478*   MCV 96 97 99     Recent Labs     06/05/24  1325 08/30/23  1454 08/16/23  1020    140 141   K 3.7 3.7 3.9    104 103   BUN 16 21 18   CREATININE 1.05 1.04 1.09     CMP -  Lab Results   Component Value Date    CALCIUM 9.7 06/05/2024    PROT 7.0 08/30/2023    ALBUMIN 4.4 08/30/2023    AST 17 08/30/2023    ALT 18 08/30/2023    ALKPHOS 88 08/30/2023    BILITOT 0.5 08/30/2023       LIPID PANEL -   Lab Results   Component Value Date    CHOL 109 08/30/2023    HDL 44.2 08/30/2023    LDLF 35 08/30/2023    TRIG 147 08/30/2023       Lab Results   Component Value Date    HGBA1C 5.8 (A) 09/13/2022       Last Cardiology Tests:  ECG:  Obtained and reviewed EKG- normal sinus rhythm HR 63    Echo:  Echo Results:  9/12/22   CONCLUSIONS:   1. Left ventricular systolic function is normal with a 60-65% estimated ejection fraction.   2. The left atrium is severely dilated.   3. There is moderate mitral annular calcification.                Assessment/Plan   Diagnoses and all orders for this visit:  Paroxysmal atrial fibrillation (CMS/HCC)  -     ECG 12 lead (Clinic Performed)  -     CBC; Future  -     Basic metabolic panel; Future  Coronary arteriosclerosis  Hypercholesterolemia  Benign essential hypertension    In summary, Mr. Huber is a pleasant 73 year-old white male with past medical history  significant for hypertension, mild left ventricular hypertrophy, hyperlipidemia, obstructive sleep apnea, family history of premature coronary artery disease, and paroxysmal atrial fibrillation and mild nonobstructive coronary artery disease on angiography. He is stable from cardiovascular perspective. His blood pressure is controlled. His recent lipid panel is at goal. I have encouraged him to lose weight and increase physical activity. Continue current cardiovascular medications. Follow up with Dr. Perrin in February.     Orders:  No orders of the defined types were placed in this encounter.     Followup Appts:  Future Appointments   Date Time Provider Department Center   8/20/2024 11:00 AM Leobardo Romero MD [id number] [facility]   9/9/2024  1:20 PM Pedro Tesfaye MD [id number] [facility]   3/17/2025 11:50 AM Keira Bradley MD [id number] [facility]   5/13/2025  1:15 PM Flavio Stevens MD [id number] [facility]   6/17/2025 10:45 AM Amanda Tsang MD [id number] [facility]           ____________________________________________________________  Barbara Yanez APRN-CNP  [facility] [employer]  [facility]

## 2024-06-11 NOTE — TELEPHONE ENCOUNTER
Result Communication    Resulted Orders   CBC   Result Value Ref Range    WBC 6.5 4.4 - 11.3 x10*3/uL    nRBC 0.0 0.0 - 0.0 /100 WBCs    RBC 3.89 (L) 4.50 - 5.90 x10*6/uL    Hemoglobin 12.6 (L) 13.5 - 17.5 g/dL    Hematocrit 37.5 (L) 41.0 - 52.0 %    MCV 96 80 - 100 fL    MCH 32.4 26.0 - 34.0 pg    MCHC 33.6 32.0 - 36.0 g/dL    RDW 12.5 11.5 - 14.5 %    Platelets 211 150 - 450 x10*3/uL   Basic metabolic panel   Result Value Ref Range    Glucose 116 (H) 74 - 99 mg/dL    Sodium 140 136 - 145 mmol/L    Potassium 3.7 3.5 - 5.3 mmol/L    Chloride 103 98 - 107 mmol/L    Bicarbonate 29 21 - 32 mmol/L    Anion Gap 12 10 - 20 mmol/L    Urea Nitrogen 16 6 - 23 mg/dL    Creatinine 1.05 0.50 - 1.30 mg/dL    eGFR 75 >60 mL/min/1.73m*2      Comment:      Calculations of estimated GFR are performed using the 2021 CKD-EPI Study Refit equation without the race variable for the IDMS-Traceable creatinine methods.  https://jasn.asnjournals.org/content/early/2021/09/22/ASN.3468842195    Calcium 9.7 8.6 - 10.6 mg/dL       11:28 AM    Left message with patient.

## 2024-06-11 NOTE — PATIENT INSTRUCTIONS
Continue current cardiovascular medications  Follow up with Dr. Perrin in February  Increase physical activity and weight loss  Follow up with primary care physician as scheduled

## 2024-06-11 NOTE — TELEPHONE ENCOUNTER
----- Message from JACOB Marks-CNP sent at 6/11/2024 10:52 AM EDT -----  Ok except mildly anemic, but improved. F/u with pcp. Renal function and electrolytes ok.

## 2024-06-11 NOTE — PROGRESS NOTES
Assessment/Plan   Diagnoses and all orders for this visit:  S/P YAG capsulotomy, left  Vision better   -Followup in 1 year or as needed for symptoms (RSVP: redness, sensitivity to light, vision loss, pain)

## 2024-06-12 LAB
ATRIAL RATE: 63 BPM
P AXIS: -24 DEGREES
P OFFSET: 192 MS
P ONSET: 120 MS
PR INTERVAL: 194 MS
Q ONSET: 217 MS
QRS COUNT: 10 BEATS
QRS DURATION: 90 MS
QT INTERVAL: 460 MS
QTC CALCULATION(BAZETT): 470 MS
QTC FREDERICIA: 467 MS
R AXIS: 25 DEGREES
T AXIS: -4 DEGREES
T OFFSET: 447 MS
VENTRICULAR RATE: 63 BPM

## 2024-08-17 NOTE — PROGRESS NOTES
Subjective    Patient ID: Matt Huber is a 74 y.o. male.    Chief Complaint: No chief complaint on file.     Last Surgery: No surgery found  Last Surgery Date: No surgery found    DOROTA Gutierrez is a 74 y.o. right hand dominant male presenting today for evaluation of their left shoulder.     3 years ago he believes he tore his bicep and he had bruising down to his elbow  It did not hurt so he left it alone. 1.5 years ago he had spine surgery with Dr. Fitzpatrick  As he was recovering from this, his shoulder began to start bothering him. His shoulder is to the point where it hurts after he has been looking at his phone for 5 minutes. He struggles with sleep at night.     8/20/24   Matt Huber is a 74 y.o. male returning to the clinic for a follow up visit regarding his right shoulder.    He explains that while his last visit was regarding his left shoulder, his right shoulder is now the problematic shoulder. The injection 3 months ago in the left side helped well. He has been doing aquatherapy and this has helped both of his shoulders immensely.     He does note that he fell out of bed 2 weeks ago. This is his second fall since December 2023. His shoulders were hurting prior to this.     Past medical history, surgical history, social history, and family history were all reviewed and are as per the [facility] patient health history questionnaire form that I signed and scanned into the chart today.     Objective   Ortho Exam  Patient is a well-developed, well-nourished male in no acute distress.  Breathes with normal chest rises.  Pupils are round and symmetric today.  Awake, alert, and oriented x3.      Examination of the right shoulder today reveals the skin to be intact. There is no sign of any atrophy, lesions, or abrasions. There is no pain to palpation of the bony prominences. Cervical lymphadenopathy examined, and this was negative. Patient had 5 out of 5 wrist flexion, extension, and thumb extension, bilaterally.  Sensation was intact to light touch to median, ulnar, radial, axillary, and musculocutaneous nerves bilaterally. Positive radial pulse bilaterally. Provocative maneuvers on the right side today were negative.  Range of motion of the right shoulder revealed 0-80° of forward elevation, 0-40° of external rotation, and internal rotation up to L-3. 5/5 RER.     Examination of the left shoulder today reveals the skin to be intact. There is no sign of any atrophy, lesions, or abrasions. Tender along the periscapular muscles. Cervical lymphadenopathy examined, and this was negative. Patient had 5 out of 5 wrist flexion, extension, and thumb extension bilaterally. Sensation was intact to light touch to median, ulnar, radial axillary, and musculocutaneous nerves bilaterally. Positive radial pulse bilaterally. Mildly positive Hawkin's ign. Range of motion of the left shoulder revealed 0-150° of forward elevation, 0-50° of external rotation, and internal rotation was to T-12. 5/5 Rosemary's testing with pain. Wyatt deformity         Image Results:  05/16/24 X-rays personally ordered and interpreted by me show no signs of any fracture, dislocation, arthritis or proximal humerus migration.      L Inj/Asp: R subacromial bursa on 8/20/2024 11:35 AM  Indications: pain  Details: 20 G needle, anterior approach  Medications: 40 mg triamcinolone acetonide 40 mg/mL; 4 mL lidocaine 10 mg/mL (1 %)  Outcome: tolerated well, no immediate complications  Procedure, treatment alternatives, risks and benefits explained, specific risks discussed. Consent was given by the patient. Immediately prior to procedure a time out was called to verify the correct patient, procedure, equipment, support staff and site/side marked as required. Patient was prepped and draped in the usual sterile fashion.             Assessment/Plan   Encounter Diagnoses:  No diagnosis found.  Patient with bilateral shoulder pain and partial left bicep rupture    At this time, we  had a very long discussion with the patient regarding the diagnosis. Rotator cuff disease is a spectrum of disorders ranging from rotator cuff tendinitis to full-thickness rotator cuff tears. We know that some patients over the age of 50 will have symptomatic rotator cuff tears just due to overuse and general wear and tear. In general, most patients who come in with complaints such as these will get better with therapy and injections alone. The therapy should be performed 2-3 times a day and should take about 10-15 minutes to perform each time.      He had great relief from his injection on the left side. We are going to try a right shoulder injection for him today. He was also provided with an aqua therapy prescription today as he is reporting it is helpful. We will see him back in 3 months for consideration of a repeat injection.    No orders of the defined types were placed in this encounter.    Follow up in 3 months     Scribe Attestation  By signing my name below, Amy MAYS Scribe   attest that this documentation has been prepared under the direction and in the presence of Leobardo Romero MD.

## 2024-08-20 ENCOUNTER — OFFICE VISIT (OUTPATIENT)
Dept: ORTHOPEDIC SURGERY | Facility: HOSPITAL | Age: 74
End: 2024-08-20
Payer: MEDICARE

## 2024-08-20 DIAGNOSIS — M25.512 LEFT SHOULDER PAIN, UNSPECIFIED CHRONICITY: Primary | ICD-10-CM

## 2024-08-20 DIAGNOSIS — M25.511 CHRONIC RIGHT SHOULDER PAIN: ICD-10-CM

## 2024-08-20 DIAGNOSIS — G89.29 CHRONIC RIGHT SHOULDER PAIN: ICD-10-CM

## 2024-08-20 PROCEDURE — 99213 OFFICE O/P EST LOW 20 MIN: CPT | Performed by: ORTHOPAEDIC SURGERY

## 2024-08-20 PROCEDURE — 1157F ADVNC CARE PLAN IN RCRD: CPT | Performed by: ORTHOPAEDIC SURGERY

## 2024-08-20 PROCEDURE — 2500000004 HC RX 250 GENERAL PHARMACY W/ HCPCS (ALT 636 FOR OP/ED): Performed by: ORTHOPAEDIC SURGERY

## 2024-08-20 PROCEDURE — 20610 DRAIN/INJ JOINT/BURSA W/O US: CPT | Mod: RT | Performed by: ORTHOPAEDIC SURGERY

## 2024-08-20 PROCEDURE — 2500000005 HC RX 250 GENERAL PHARMACY W/O HCPCS: Performed by: ORTHOPAEDIC SURGERY

## 2024-08-21 RX ORDER — LIDOCAINE HYDROCHLORIDE 10 MG/ML
4 INJECTION INFILTRATION; PERINEURAL
Status: COMPLETED | OUTPATIENT
Start: 2024-08-20 | End: 2024-08-20

## 2024-08-21 RX ORDER — TRIAMCINOLONE ACETONIDE 40 MG/ML
40 INJECTION, SUSPENSION INTRA-ARTICULAR; INTRAMUSCULAR
Status: COMPLETED | OUTPATIENT
Start: 2024-08-20 | End: 2024-08-20

## 2024-09-09 ENCOUNTER — APPOINTMENT (OUTPATIENT)
Dept: PRIMARY CARE | Facility: CLINIC | Age: 74
End: 2024-09-09
Payer: MEDICARE

## 2024-09-09 VITALS
TEMPERATURE: 98.3 F | SYSTOLIC BLOOD PRESSURE: 125 MMHG | DIASTOLIC BLOOD PRESSURE: 81 MMHG | WEIGHT: 200.2 LBS | BODY MASS INDEX: 34.18 KG/M2 | HEIGHT: 64 IN | HEART RATE: 89 BPM

## 2024-09-09 DIAGNOSIS — H91.93 BILATERAL HEARING LOSS, UNSPECIFIED HEARING LOSS TYPE: ICD-10-CM

## 2024-09-09 DIAGNOSIS — R73.03 PREDIABETES: ICD-10-CM

## 2024-09-09 DIAGNOSIS — I48.0 PAROXYSMAL ATRIAL FIBRILLATION (MULTI): ICD-10-CM

## 2024-09-09 DIAGNOSIS — I10 BENIGN ESSENTIAL HYPERTENSION: ICD-10-CM

## 2024-09-09 DIAGNOSIS — Z00.00 PREVENTATIVE HEALTH CARE: Primary | ICD-10-CM

## 2024-09-09 DIAGNOSIS — Z23 NEED FOR INFLUENZA VACCINATION: ICD-10-CM

## 2024-09-09 DIAGNOSIS — E03.9 HYPOTHYROIDISM, UNSPECIFIED TYPE: ICD-10-CM

## 2024-09-09 LAB — POC HEMOGLOBIN A1C: 6 % (ref 4.2–6.5)

## 2024-09-09 PROCEDURE — 1170F FXNL STATUS ASSESSED: CPT | Performed by: INTERNAL MEDICINE

## 2024-09-09 PROCEDURE — 3008F BODY MASS INDEX DOCD: CPT | Performed by: INTERNAL MEDICINE

## 2024-09-09 PROCEDURE — 83036 HEMOGLOBIN GLYCOSYLATED A1C: CPT | Performed by: INTERNAL MEDICINE

## 2024-09-09 PROCEDURE — 3079F DIAST BP 80-89 MM HG: CPT | Performed by: INTERNAL MEDICINE

## 2024-09-09 PROCEDURE — 90662 IIV NO PRSV INCREASED AG IM: CPT | Performed by: INTERNAL MEDICINE

## 2024-09-09 PROCEDURE — G0008 ADMIN INFLUENZA VIRUS VAC: HCPCS | Performed by: INTERNAL MEDICINE

## 2024-09-09 PROCEDURE — 1157F ADVNC CARE PLAN IN RCRD: CPT | Performed by: INTERNAL MEDICINE

## 2024-09-09 PROCEDURE — 1126F AMNT PAIN NOTED NONE PRSNT: CPT | Performed by: INTERNAL MEDICINE

## 2024-09-09 PROCEDURE — 1160F RVW MEDS BY RX/DR IN RCRD: CPT | Performed by: INTERNAL MEDICINE

## 2024-09-09 PROCEDURE — 1159F MED LIST DOCD IN RCRD: CPT | Performed by: INTERNAL MEDICINE

## 2024-09-09 PROCEDURE — G0439 PPPS, SUBSEQ VISIT: HCPCS | Performed by: INTERNAL MEDICINE

## 2024-09-09 PROCEDURE — 3074F SYST BP LT 130 MM HG: CPT | Performed by: INTERNAL MEDICINE

## 2024-09-09 ASSESSMENT — ENCOUNTER SYMPTOMS
DEPRESSION: 0
LOSS OF SENSATION IN FEET: 0
OCCASIONAL FEELINGS OF UNSTEADINESS: 0

## 2024-09-09 ASSESSMENT — ACTIVITIES OF DAILY LIVING (ADL)
MANAGING_FINANCES: INDEPENDENT
BATHING: INDEPENDENT
GROCERY_SHOPPING: INDEPENDENT
DRESSING: INDEPENDENT
DOING_HOUSEWORK: INDEPENDENT
TAKING_MEDICATION: INDEPENDENT

## 2024-09-09 ASSESSMENT — PATIENT HEALTH QUESTIONNAIRE - PHQ9
SUM OF ALL RESPONSES TO PHQ9 QUESTIONS 1 AND 2: 0
2. FEELING DOWN, DEPRESSED OR HOPELESS: NOT AT ALL
1. LITTLE INTEREST OR PLEASURE IN DOING THINGS: NOT AT ALL

## 2024-09-09 ASSESSMENT — PAIN SCALES - GENERAL: PAINLEVEL: 0-NO PAIN

## 2024-09-09 NOTE — ASSESSMENT & PLAN NOTE
CRC screening is UTD. Due 2028.  Prostate CA screening is unnecessary.  Vaccination: High dose influenza today. COVID booster ~ 11/1/24.

## 2024-09-09 NOTE — ASSESSMENT & PLAN NOTE
Well controlled. Continue Current Management    Orders:    CBC and Auto Differential; Future    Comprehensive Metabolic Panel; Future    Lipid Panel; Future

## 2024-09-09 NOTE — PROGRESS NOTES
Subjective   Reason for Visit: Matt Huber is an 74 y.o. male here for a Medicare Wellness visit.             Current Outpatient Medications   Medication Instructions    acetaminophen (TYLENOL) 1,000 mg, oral, 2 times daily    apixaban (ELIQUIS) 5 mg, oral, 2 times daily    atorvastatin (LIPITOR) 80 mg, oral, Daily    buPROPion XL (Wellbutrin XL) 150 mg 24 hr tablet 2 tablets, oral, Daily    chlorthalidone (HYGROTON) 12.5 mg, oral, Daily    flecainide (TAMBOCOR) 300 mg, oral, Once as needed    gabapentin (Neurontin) 300 mg capsule TAKE 1 CAPSULE DAILY AT BEDTIME IF TOLERATING WELL MAY INCREASE TO 2 CAPSULE DAILY AT BEDTIME    levothyroxine (Synthroid, Levoxyl) 75 mcg tablet TAKE 1 TABLET DAILY AND 2 TABLETS ON SUNDAYS    metoprolol succinate XL (TOPROL-XL) 150 mg, oral, Daily    multivitamin-children's (Cerovite, Jr) chewable tablet 1 tablet, oral, Daily    omega-3 fatty acids-fish oil (Fish OiL) 340-1,000 mg capsule oral, 2 times daily    omeprazole (PRILOSEC) 40 mg, oral, 2 times daily before meals    sildenafil (VIAGRA) 50 mg, oral, As needed    tamsulosin (FLOMAX) 0.4 mg, oral, Daily       HPI    Patient Care Team:  Pedro Tesfaye MD as PCP - General     Review of Systems  General: Denies weakness, fever, anorexia. Denies significant change in weight.  HEENT: Denies HA, vision change or problem, tinnitus, does have hearing loss. Frequent hoarseness, occasional trouble swallowing pills.  Resp: Denies SOB, cough, or wheezing.  CV: Denies chest pain or pressure, palpitations, syncope, edema, or claudication.  GI : Denies abdominal pain, diarrhea, constipation, heartburn, rectal bleeding, or change in bowel habits.  : Denies urinary frequency, pain, blood, incontinence, or nocturia.  Sexual: No sexual health or reproductive system concerns.  MSK: Denies significant musculoskeletal pains or limitations EXCEPT AS FOLLOWS...admits to neck pain, shoulders, wrists, fingers, hips, not so much below the  "waist.  Neuro: Denies tingling, weakness, tremor, balance problems, falls, or memory loss. (Notes short-term memory is not as good as before, passed MINI-COG today). Some numbness in the L ankle from back  Psych: Denies Mood or sleep issues.  Derm: No unusual lesions, moles or rashes. Had another SCC of shoulder.    Objective   Vitals:  /81 (BP Location: Left arm, Patient Position: Sitting, BP Cuff Size: Adult)   Pulse 89   Temp 36.8 °C (98.3 °F) (Oral)   Ht 1.626 m (5' 4\")   Wt 90.8 kg (200 lb 3.2 oz)   BMI 34.36 kg/m²       Physical Exam  Constitutional: Alert and in no acute distress  Inspection of Eyes: Sclera and conjunctivae normal.  Pupil exam: PERRL. EOMI.  Tympanic membranes are normal. External ears appear normal.   Oropharynx: Normal with MMM.   Neck: Thyroid normal. No cervical lymphadenopathy. No carotid bruit.  No cervical, axillary, or inguinal lymphadenopathy.  Breasts: No masses.   Lungs are clear to auscultation and percussion.  Cardiac: S1 and S2 are normal. No murmurs, rubs, or gallops. No edema. Premature beats noted.  Abdomen: Soft, nontender. Normal bowel sounds. No hepatosplenomegaly or masses.  Musculoskeletal: Examination of gait is normal. No clubbing or cyanosis. Full range of motion of joints. NO swelling, tenderness, or limitation of motion. Midline scar down the entire spine.  Skin normal skin color and pigmentation. No visible rash. Nml skin turgor.  Neurological: Cranial nerves II-XII intact. Deep tendon reflexes 2+ and symmetric. No focal motor weakness. Sensation and vibration are normal. No pronator drift. Coordination normal. Balance normal.  Psychiatric: A&Ox3. Mood and affect normal.    Assessment & Plan  Preventative health care  CRC screening is UTD. Due 2028.  Prostate CA screening is unnecessary.  Vaccination: High dose influenza today. COVID booster ~ 11/1/24.         Prediabetes  A1C=6.0%    Orders:    POCT glycosylated hemoglobin (Hb A1C) manually " resulted    Bilateral hearing loss, unspecified hearing loss type  Consider amplification.         Paroxysmal atrial fibrillation (Multi)  No recent episodes of afib. On anticoagulant. Sees Cardiology.         Hypothyroidism, unspecified type  Assess TSH. Clinically euthyroid.    Orders:    Thyroid Stimulating Hormone; Future    Benign essential hypertension  Well controlled. Continue Current Management    Orders:    CBC and Auto Differential; Future    Comprehensive Metabolic Panel; Future    Lipid Panel; Future

## 2024-09-23 ENCOUNTER — APPOINTMENT (OUTPATIENT)
Dept: GASTROENTEROLOGY | Facility: CLINIC | Age: 74
End: 2024-09-23
Payer: MEDICARE

## 2024-09-23 VITALS — WEIGHT: 198 LBS | OXYGEN SATURATION: 97 % | HEART RATE: 68 BPM | HEIGHT: 64 IN | BODY MASS INDEX: 33.8 KG/M2

## 2024-09-23 DIAGNOSIS — K21.9 GASTROESOPHAGEAL REFLUX DISEASE WITHOUT ESOPHAGITIS: ICD-10-CM

## 2024-09-23 DIAGNOSIS — Z80.0 FAMILY HISTORY OF COLON CANCER: ICD-10-CM

## 2024-09-23 DIAGNOSIS — K22.70 BARRETT'S ESOPHAGUS WITHOUT DYSPLASIA: Primary | ICD-10-CM

## 2024-09-23 DIAGNOSIS — R15.1 FECAL SMEARING: ICD-10-CM

## 2024-09-23 PROCEDURE — 1036F TOBACCO NON-USER: CPT | Performed by: INTERNAL MEDICINE

## 2024-09-23 PROCEDURE — 99214 OFFICE O/P EST MOD 30 MIN: CPT | Performed by: INTERNAL MEDICINE

## 2024-09-23 PROCEDURE — 1157F ADVNC CARE PLAN IN RCRD: CPT | Performed by: INTERNAL MEDICINE

## 2024-09-23 PROCEDURE — 1159F MED LIST DOCD IN RCRD: CPT | Performed by: INTERNAL MEDICINE

## 2024-09-23 PROCEDURE — 3008F BODY MASS INDEX DOCD: CPT | Performed by: INTERNAL MEDICINE

## 2024-09-23 RX ORDER — FAMOTIDINE 40 MG/1
40 TABLET, FILM COATED ORAL NIGHTLY
Qty: 90 TABLET | Refills: 1 | Status: SHIPPED | OUTPATIENT
Start: 2024-09-23 | End: 2025-09-23

## 2024-09-23 NOTE — LETTER
September 23, 2024     Pedro Tesfaye MD  [address] [facility] [address]    Patient: Matt Huber   YOB: 1950   Date of Visit: 9/23/2024       Dear Dr. Pedro Tesfaye MD:    Thank you for referring Matt Huber to me for evaluation. Below are my notes for this consultation.  If you have questions, please do not hesitate to call me. I look forward to following your patient along with you.       Sincerely,     Crissy Osorio MD, MS      CC: No Recipients  ______________________________________________________________________________________    Primary Care Provider: Pedro Tesfaye MD  Referring Provider: No ref. provider found  My final recommendations will be communicated back to the referring physician and/or primary care provider via shared medical records or via US mail.    Chief Complaint (Reason for visit):   Matt Huber is a 74 y.o. male who presents for follow-up of Pitts's esophagus    ASSESSMENT AND PLAN     Assessment & Plan  Pitts's esophagus without dysplasia  Patient is on PPI twice daily  As per patient, his last scope was in 2021  -Repeat EGD with biopsies  Orders:  •  famotidine (Pepcid) 40 mg tablet; Take 1 tablet (40 mg) by mouth once daily at bedtime.  •  Esophagogastroduodenoscopy (EGD); Future    Gastroesophageal reflux disease without esophagitis  Despite taking omeprazole 40 mg twice a day, patient still has nightly heartburn  -I will add famotidine 40 mg every night  -Patient can take Tums as needed        Family history of colon cancer  [id number], no polyps.  5-year surveillance recommended       Fecal smearing  Patient has fecal incontinence when his stools are loose.  This happens about 1-2 times a month.  This is usually preceded by constipation    -I will ask the patient to avoid constipation by taking MiraLAX early on  -He can also start taking a fiber supplement like Metamucil every day    Next> if these things do not help, he may  need a repeat colonoscopy with biopsy to rule out microscopic colitis.  Also stool tests.          I discussed the risks, benefits and alternative(s) of the procedure(s) with the patient. Pt verbalizes understanding and is willing to proceed. All questions were answered.    Crissy Osorio MD, MS  9/23/2024    SUBJECTIVE   HPI: History obtained from patient    A) patient comes to see me for multiple complaints  He has heartburn and is on PPI twice daily.  He has a history of Pitts's esophagus.  Despite taking omeprazole 40 mg twice a day he still has heartburn at bedtime.  He keeps about 4 to 5 hours between his supper and his bedtime.    B) patient complains of fecal incontinence.  This has happened 2 times in the last month.  Patient states that he usually gets constipated for about 2 to 3 days, then he has diarrhea.  When his stool is loose, he loses control and he has fecal incontinence    Past Medical History:   Diagnosis Date   • A-fib (Multi)    • Acute bronchospasm 02/12/2019    Cough due to bronchospasm   • Amblyopia of eye, right    • Cardiomegaly 08/27/2013    Left ventricular hypertrophy   • Cataract    • Displaced fracture of coronoid process of left ulna, initial encounter for closed fracture 04/22/2015    Fracture of coronoid process of left ulna   • Exotropia of right eye    • Hypermetropia of both eyes    • Hypertension    • Medial epicondylitis, left elbow 04/13/2015    Medial epicondylitis of left elbow   • Monocular diplopia    • Other chondrocalcinosis, right knee 08/05/2015    Chondrocalcinosis of knee, right   • Other enthesopathies, not elsewhere classified 03/31/2014    Tendonitis of shoulder   • Other enthesopathies, not elsewhere classified 05/22/2015    Tendonitis of elbow, left   • Pain in left knee 01/10/2020    Left knee pain   • Pain in unspecified hip 02/24/2016    Hip pain   • Pain in unspecified shoulder 03/18/2014    Shoulder pain   • Paroxysmal atrial fibrillation (Multi)  11/22/2022    Paroxysmal atrial fibrillation   • Personal history of other diseases of the circulatory system 01/31/2014    History of mitral valve insufficiency   • Personal history of other diseases of the musculoskeletal system and connective tissue 12/30/2014    History of trigger finger   • Presbyopia of both eyes    • Strabismus    • Type 2 macular telangiectasis    • Unilateral primary osteoarthritis, left knee 01/21/2019    Primary localized osteoarthrosis of left lower leg   • Unspecified acute lower respiratory infection 06/10/2016    Acute lower respiratory tract infection   • Unspecified injury of left wrist, hand and finger(s), initial encounter 05/22/2015    Left wrist injury   • Unspecified injury of unspecified elbow, initial encounter 05/22/2015    Elbow injury       Past Surgical History:   Procedure Laterality Date   • BACK SURGERY  02/04/2014    Back Surgery   • CATARACT EXTRACTION W/  INTRAOCULAR LENS IMPLANT Left 09/13/2023    Dr Tsang   • CATARACT EXTRACTION W/  INTRAOCULAR LENS IMPLANT Right 08/22/2023    Dr Tsang   • KNEE SURGERY  02/04/2014    Knee Surgery Right   • KNEE SURGERY  02/04/2014    Knee Surgery Left   • MR HEAD ANGIO WO IV CONTRAST  01/06/2016    MR HEAD ANGIO WO IV CONTRAST 1/6/2016 [facility]   • MR NECK ANGIO WO IV CONTRAST  01/06/2016    MR NECK ANGIO WO IV CONTRAST 1/6/2016 [facility]   • OTHER SURGICAL HISTORY  08/19/2020    Strabismus surgery   • OTHER SURGICAL HISTORY  08/19/2020    Knee replacement   • OTHER SURGICAL HISTORY  02/04/2014    Wrist Carpectomy   • SPINE SURGERY  01/2023    1.  L1/L2-L5/S1 bilateral laminectomy facetectomy and foraminotomies  2.  L5-S1 transforaminal lumbar interbody fusion with titanium interbody cage,  local bone graft and allograft chips  3.  T4 to ilium thoracolumbar fusion with Solera instrumentation, local bone  graft, allograft chips, demineralized bone matrix, and bone marrow aspirate 4.  Cement augmentation  T4-T5 with Kyphon cement   • TOTAL KNEE ARTHROPLASTY  08/09/2017    Total Knee Arthroplasty       Current Outpatient Medications   Medication Sig Dispense Refill   • acetaminophen (Tylenol) 500 mg tablet Take 2 tablets (1,000 mg) by mouth 2 times a day.     • apixaban (Eliquis) 5 mg tablet Take 1 tablet (5 mg) by mouth 2 times a day. 180 tablet 3   • atorvastatin (Lipitor) 80 mg tablet Take 1 tablet (80 mg) by mouth once daily. 90 tablet 3   • buPROPion XL (Wellbutrin XL) 150 mg 24 hr tablet Take 2 tablets (300 mg) by mouth once daily.     • chlorthalidone (Hygroton) 25 mg tablet Take 0.5 tablets (12.5 mg) by mouth once daily. 45 tablet 3   • flecainide (Tambocor) 100 mg tablet Take 3 tablets (300 mg) by mouth 1 time if needed.     • gabapentin (Neurontin) 300 mg capsule TAKE 1 CAPSULE DAILY AT BEDTIME IF TOLERATING WELL MAY INCREASE TO 2 CAPSULE DAILY AT BEDTIME 60 capsule 5   • levothyroxine (Synthroid, Levoxyl) 75 mcg tablet TAKE 1 TABLET DAILY AND 2 TABLETS ON SUNDAYS 103 tablet 3   • metoprolol succinate XL (Toprol-XL) 50 mg 24 hr tablet Take 3 tablets (150 mg) by mouth once daily. 270 tablet 3   • multivitamin-children's (Cerovite, Jr) chewable tablet Chew 1 tablet once daily.     • omega-3 fatty acids-fish oil (Fish OiL) 340-1,000 mg capsule Take by mouth twice a day.     • omeprazole (PriLOSEC) 40 mg DR capsule Take 1 capsule (40 mg) by mouth 2 times a day before meals. 180 capsule 3   • sildenafil (Viagra) 50 mg tablet Take 1 tablet (50 mg) by mouth if needed for erectile dysfunction. 30 tablet 6   • tamsulosin (Flomax) 0.4 mg 24 hr capsule TAKE 1 CAPSULE ONE TIME DAILY 90 capsule 3     No current facility-administered medications for this visit.       Allergies   Allergen Reactions   • Ace Inhibitors Cough and Other     severe cough   • Codeine Nausea Only     nausea   • House Dust Cough   • Pollen Extracts Cough   • Sulfa (Sulfonamide Antibiotics) Nausea/vomiting     OBJECTIVE   PHYSICAL EXAM:  Pulse 68   " Ht 1.626 m (5' 4\")   Wt 89.8 kg (198 lb)   SpO2 97%   BMI 33.99 kg/m²      LABS/IMAGING/SCOPES  Lab Results   Component Value Date    WBC 6.5 06/05/2024    HGB 12.6 (L) 06/05/2024    HCT 37.5 (L) 06/05/2024    MCV 96 06/05/2024     06/05/2024     Lab Results   Component Value Date    GLUCOSE 116 (H) 06/05/2024    CALCIUM 9.7 06/05/2024     06/05/2024    K 3.7 06/05/2024    CO2 29 06/05/2024     06/05/2024    BUN 16 06/05/2024    CREATININE 1.05 06/05/2024     Lab Results   Component Value Date    ALT 18 08/30/2023    AST 17 08/30/2023    ALKPHOS 88 08/30/2023    BILITOT 0.5 08/30/2023       === 04/27/12 ===    CT CHEST W CONTRAST    - Impression -  .  Very mild nonspecific mediastinal and right hilar adenopathy, most  likely reactive.    Bilateral renal cysts. Small Left renal cortical scar as described.    Second -third portion duodenal diverticulum.    Band of atelectasis or linear scarring across the anteromedial left  upper lobe. No other abnormal density in either lung.  .    This exam  was interpreted at [facility].    Crissy Osorio MD, MS  9/23/2024  "

## 2024-09-23 NOTE — PROGRESS NOTES
Primary Care Provider: Pedro Tesfaye MD  Referring Provider: No ref. provider found  My final recommendations will be communicated back to the referring physician and/or primary care provider via shared medical records or via US mail.    Chief Complaint (Reason for visit):   Matt Huber is a 74 y.o. male who presents for follow-up of Pitts's esophagus    ASSESSMENT AND PLAN     Assessment & Plan  Pitts's esophagus without dysplasia  Patient is on PPI twice daily  As per patient, his last scope was in 2021  -Repeat EGD with biopsies  Orders:    famotidine (Pepcid) 40 mg tablet; Take 1 tablet (40 mg) by mouth once daily at bedtime.    Esophagogastroduodenoscopy (EGD); Future    Gastroesophageal reflux disease without esophagitis  Despite taking omeprazole 40 mg twice a day, patient still has nightly heartburn  -I will add famotidine 40 mg every night  -Patient can take Tums as needed        Family history of colon cancer  [id number], no polyps.  5-year surveillance recommended       Fecal smearing  Patient has fecal incontinence when his stools are loose.  This happens about 1-2 times a month.  This is usually preceded by constipation    -I will ask the patient to avoid constipation by taking MiraLAX early on  -He can also start taking a fiber supplement like Metamucil every day    Next> if these things do not help, he may need a repeat colonoscopy with biopsy to rule out microscopic colitis.  Also stool tests.          I discussed the risks, benefits and alternative(s) of the procedure(s) with the patient. Pt verbalizes understanding and is willing to proceed. All questions were answered.    Crissy Osorio MD, MS  9/23/2024    SUBJECTIVE   HPI: History obtained from patient    A) patient comes to see me for multiple complaints  He has heartburn and is on PPI twice daily.  He has a history of Pitts's esophagus.  Despite taking omeprazole 40 mg twice a day he still has heartburn at bedtime.   He keeps about 4 to 5 hours between his supper and his bedtime.    B) patient complains of fecal incontinence.  This has happened 2 times in the last month.  Patient states that he usually gets constipated for about 2 to 3 days, then he has diarrhea.  When his stool is loose, he loses control and he has fecal incontinence    Past Medical History:   Diagnosis Date    A-fib (Multi)     Acute bronchospasm 02/12/2019    Cough due to bronchospasm    Amblyopia of eye, right     Cardiomegaly 08/27/2013    Left ventricular hypertrophy    Cataract     Displaced fracture of coronoid process of left ulna, initial encounter for closed fracture 04/22/2015    Fracture of coronoid process of left ulna    Exotropia of right eye     Hypermetropia of both eyes     Hypertension     Medial epicondylitis, left elbow 04/13/2015    Medial epicondylitis of left elbow    Monocular diplopia     Other chondrocalcinosis, right knee 08/05/2015    Chondrocalcinosis of knee, right    Other enthesopathies, not elsewhere classified 03/31/2014    Tendonitis of shoulder    Other enthesopathies, not elsewhere classified 05/22/2015    Tendonitis of elbow, left    Pain in left knee 01/10/2020    Left knee pain    Pain in unspecified hip 02/24/2016    Hip pain    Pain in unspecified shoulder 03/18/2014    Shoulder pain    Paroxysmal atrial fibrillation (Multi) 11/22/2022    Paroxysmal atrial fibrillation    Personal history of other diseases of the circulatory system 01/31/2014    History of mitral valve insufficiency    Personal history of other diseases of the musculoskeletal system and connective tissue 12/30/2014    History of trigger finger    Presbyopia of both eyes     Strabismus     Type 2 macular telangiectasis     Unilateral primary osteoarthritis, left knee 01/21/2019    Primary localized osteoarthrosis of left lower leg    Unspecified acute lower respiratory infection 06/10/2016    Acute lower respiratory tract infection    Unspecified injury  of left wrist, hand and finger(s), initial encounter 05/22/2015    Left wrist injury    Unspecified injury of unspecified elbow, initial encounter 05/22/2015    Elbow injury       Past Surgical History:   Procedure Laterality Date    BACK SURGERY  02/04/2014    Back Surgery    CATARACT EXTRACTION W/  INTRAOCULAR LENS IMPLANT Left 09/13/2023    Dr Tsang    CATARACT EXTRACTION W/  INTRAOCULAR LENS IMPLANT Right 08/22/2023    Dr Tsang    KNEE SURGERY  02/04/2014    Knee Surgery Right    KNEE SURGERY  02/04/2014    Knee Surgery Left    MR HEAD ANGIO WO IV CONTRAST  01/06/2016    MR HEAD ANGIO WO IV CONTRAST 1/6/2016 [facility]    MR NECK ANGIO WO IV CONTRAST  01/06/2016    MR NECK ANGIO WO IV CONTRAST 1/6/2016 [facility]    OTHER SURGICAL HISTORY  08/19/2020    Strabismus surgery    OTHER SURGICAL HISTORY  08/19/2020    Knee replacement    OTHER SURGICAL HISTORY  02/04/2014    Wrist Carpectomy    SPINE SURGERY  01/2023    1.  L1/L2-L5/S1 bilateral laminectomy facetectomy and foraminotomies  2.  L5-S1 transforaminal lumbar interbody fusion with titanium interbody cage,  local bone graft and allograft chips  3.  T4 to ilium thoracolumbar fusion with Solera instrumentation, local bone  graft, allograft chips, demineralized bone matrix, and bone marrow aspirate 4.  Cement augmentation T4-T5 with Kyphon cement    TOTAL KNEE ARTHROPLASTY  08/09/2017    Total Knee Arthroplasty       Current Outpatient Medications   Medication Sig Dispense Refill    acetaminophen (Tylenol) 500 mg tablet Take 2 tablets (1,000 mg) by mouth 2 times a day.      apixaban (Eliquis) 5 mg tablet Take 1 tablet (5 mg) by mouth 2 times a day. 180 tablet 3    atorvastatin (Lipitor) 80 mg tablet Take 1 tablet (80 mg) by mouth once daily. 90 tablet 3    buPROPion XL (Wellbutrin XL) 150 mg 24 hr tablet Take 2 tablets (300 mg) by mouth once daily.      chlorthalidone (Hygroton) 25 mg tablet Take 0.5 tablets (12.5 mg) by mouth once daily.  "45 tablet 3    flecainide (Tambocor) 100 mg tablet Take 3 tablets (300 mg) by mouth 1 time if needed.      gabapentin (Neurontin) 300 mg capsule TAKE 1 CAPSULE DAILY AT BEDTIME IF TOLERATING WELL MAY INCREASE TO 2 CAPSULE DAILY AT BEDTIME 60 capsule 5    levothyroxine (Synthroid, Levoxyl) 75 mcg tablet TAKE 1 TABLET DAILY AND 2 TABLETS ON SUNDAYS 103 tablet 3    metoprolol succinate XL (Toprol-XL) 50 mg 24 hr tablet Take 3 tablets (150 mg) by mouth once daily. 270 tablet 3    multivitamin-children's (Cerovite, Jr) chewable tablet Chew 1 tablet once daily.      omega-3 fatty acids-fish oil (Fish OiL) 340-1,000 mg capsule Take by mouth twice a day.      omeprazole (PriLOSEC) 40 mg DR capsule Take 1 capsule (40 mg) by mouth 2 times a day before meals. 180 capsule 3    sildenafil (Viagra) 50 mg tablet Take 1 tablet (50 mg) by mouth if needed for erectile dysfunction. 30 tablet 6    tamsulosin (Flomax) 0.4 mg 24 hr capsule TAKE 1 CAPSULE ONE TIME DAILY 90 capsule 3     No current facility-administered medications for this visit.       Allergies   Allergen Reactions    Ace Inhibitors Cough and Other     severe cough    Codeine Nausea Only     nausea    House Dust Cough    Pollen Extracts Cough    Sulfa (Sulfonamide Antibiotics) Nausea/vomiting     OBJECTIVE   PHYSICAL EXAM:  Pulse 68   Ht 1.626 m (5' 4\")   Wt 89.8 kg (198 lb)   SpO2 97%   BMI 33.99 kg/m²      LABS/IMAGING/SCOPES  Lab Results   Component Value Date    WBC 6.5 06/05/2024    HGB 12.6 (L) 06/05/2024    HCT 37.5 (L) 06/05/2024    MCV 96 06/05/2024     06/05/2024     Lab Results   Component Value Date    GLUCOSE 116 (H) 06/05/2024    CALCIUM 9.7 06/05/2024     06/05/2024    K 3.7 06/05/2024    CO2 29 06/05/2024     06/05/2024    BUN 16 06/05/2024    CREATININE 1.05 06/05/2024     Lab Results   Component Value Date    ALT 18 08/30/2023    AST 17 08/30/2023    ALKPHOS 88 08/30/2023    BILITOT 0.5 08/30/2023       === 04/27/12 ===    CT " CHEST W CONTRAST    - Impression -  .  Very mild nonspecific mediastinal and right hilar adenopathy, most  likely reactive.    Bilateral renal cysts. Small Left renal cortical scar as described.    Second -third portion duodenal diverticulum.    Band of atelectasis or linear scarring across the anteromedial left  upper lobe. No other abnormal density in either lung.  .    This exam  was interpreted at [facility].    Crissy Osorio MD, MS  9/23/2024

## 2024-09-23 NOTE — ASSESSMENT & PLAN NOTE
Patient is on PPI twice daily  As per patient, his last scope was in 2021  -Repeat EGD with biopsies  Orders:    famotidine (Pepcid) 40 mg tablet; Take 1 tablet (40 mg) by mouth once daily at bedtime.    Esophagogastroduodenoscopy (EGD); Future

## 2024-09-25 ENCOUNTER — TELEPHONE (OUTPATIENT)
Dept: UROLOGY | Facility: CLINIC | Age: 74
End: 2024-09-25

## 2024-09-25 ENCOUNTER — APPOINTMENT (OUTPATIENT)
Dept: PRIMARY CARE | Facility: CLINIC | Age: 74
End: 2024-09-25
Payer: MEDICARE

## 2024-09-25 NOTE — TELEPHONE ENCOUNTER
Patient called nurse line reporting difficulties urinating over the last couple weeks. He reports sometimes he can go 12 hours without urinating. He stopped his Tamsulosin thinking that was the source of him retaining but his symptoms remained the same. Discussed with patient to restart his Tamsulosin 0.4 mg. He can up his Tamsulosin to 0.8 mg to see if that helps. Patient is scheduled with a NP tomorrow at 11am for a PVR check. If he has difficulties urinating or develops pain, he will go to the ER for further management.

## 2024-09-26 ENCOUNTER — OFFICE VISIT (OUTPATIENT)
Dept: UROLOGY | Facility: CLINIC | Age: 74
End: 2024-09-26
Payer: MEDICARE

## 2024-09-26 DIAGNOSIS — N13.8 BPH WITH OBSTRUCTION/LOWER URINARY TRACT SYMPTOMS: Primary | ICD-10-CM

## 2024-09-26 DIAGNOSIS — N40.1 BPH WITH OBSTRUCTION/LOWER URINARY TRACT SYMPTOMS: Primary | ICD-10-CM

## 2024-09-26 LAB
POC APPEARANCE, URINE: CLEAR
POC BILIRUBIN, URINE: ABNORMAL
POC BLOOD, URINE: NEGATIVE
POC COLOR, URINE: YELLOW
POC GLUCOSE, URINE: NEGATIVE MG/DL
POC KETONES, URINE: NEGATIVE MG/DL
POC LEUKOCYTES, URINE: NEGATIVE
POC NITRITE,URINE: NEGATIVE
POC PH, URINE: 7 PH
POC PROTEIN, URINE: ABNORMAL MG/DL
POC SPECIFIC GRAVITY, URINE: 1.02
POC UROBILINOGEN, URINE: 1 EU/DL

## 2024-09-26 PROCEDURE — 51798 US URINE CAPACITY MEASURE: CPT | Performed by: NURSE PRACTITIONER

## 2024-09-26 PROCEDURE — 1159F MED LIST DOCD IN RCRD: CPT | Performed by: NURSE PRACTITIONER

## 2024-09-26 PROCEDURE — 99213 OFFICE O/P EST LOW 20 MIN: CPT | Performed by: NURSE PRACTITIONER

## 2024-09-26 PROCEDURE — 1160F RVW MEDS BY RX/DR IN RCRD: CPT | Performed by: NURSE PRACTITIONER

## 2024-09-26 PROCEDURE — 1157F ADVNC CARE PLAN IN RCRD: CPT | Performed by: NURSE PRACTITIONER

## 2024-09-26 PROCEDURE — 81003 URINALYSIS AUTO W/O SCOPE: CPT | Performed by: NURSE PRACTITIONER

## 2024-09-26 NOTE — PROGRESS NOTES
[facility]  Outpatient Clinic Note    Subjective   Matt Huber is a 74 y.o. male    History of Present Illness   Patient presenting to clinic today for FUV. History of BPH with LUTS, TakingTamsulosin 0.4 mg   ED on Sildenafil 50 mg prn. Todays visit he complains of recent bladder pressure, dysuria, difficulty starting urine flow. Does not feel that he is emptying bladder completely.  He was also constipated for approximately 3 days during this time. When constipation resolved, symptoms resolved. Today he denies dysuria, no pressure, but had to strain slightly to provide urine sample. PVR 0 ml     Also reports Decreased libido since July. Has not used Viagra recently. Concern that he may have delayed   Erection or inability to ejaculate if he is not fully engaged.     Urinalysis today Negative   PVR 0 ml     Lab Results   Component Value Date    PSA 1.16 10/22/2018       Past Medical History and Surgical History   Past Medical History:   Diagnosis Date    A-fib (Multi)     Acute bronchospasm 02/12/2019    Cough due to bronchospasm    Amblyopia of eye, right     Cardiomegaly 08/27/2013    Left ventricular hypertrophy    Cataract     Displaced fracture of coronoid process of left ulna, initial encounter for closed fracture 04/22/2015    Fracture of coronoid process of left ulna    Exotropia of right eye     Hypermetropia of both eyes     Hypertension     Medial epicondylitis, left elbow 04/13/2015    Medial epicondylitis of left elbow    Monocular diplopia     Other chondrocalcinosis, right knee 08/05/2015    Chondrocalcinosis of knee, right    Other enthesopathies, not elsewhere classified 03/31/2014    Tendonitis of shoulder    Other enthesopathies, not elsewhere classified 05/22/2015    Tendonitis of elbow, left    Pain in left knee 01/10/2020    Left knee pain    Pain in unspecified hip 02/24/2016    Hip pain    Pain in unspecified shoulder 03/18/2014    Shoulder pain    Paroxysmal atrial fibrillation  (Multi) 11/22/2022    Paroxysmal atrial fibrillation    Personal history of other diseases of the circulatory system 01/31/2014    History of mitral valve insufficiency    Personal history of other diseases of the musculoskeletal system and connective tissue 12/30/2014    History of trigger finger    Presbyopia of both eyes     Strabismus     Type 2 macular telangiectasis     Unilateral primary osteoarthritis, left knee 01/21/2019    Primary localized osteoarthrosis of left lower leg    Unspecified acute lower respiratory infection 06/10/2016    Acute lower respiratory tract infection    Unspecified injury of left wrist, hand and finger(s), initial encounter 05/22/2015    Left wrist injury    Unspecified injury of unspecified elbow, initial encounter 05/22/2015    Elbow injury     Past Surgical History:   Procedure Laterality Date    BACK SURGERY  02/04/2014    Back Surgery    CATARACT EXTRACTION W/  INTRAOCULAR LENS IMPLANT Left 09/13/2023    Dr Tsang    CATARACT EXTRACTION W/  INTRAOCULAR LENS IMPLANT Right 08/22/2023    Dr Tsang    KNEE SURGERY  02/04/2014    Knee Surgery Right    KNEE SURGERY  02/04/2014    Knee Surgery Left    MR HEAD ANGIO WO IV CONTRAST  01/06/2016    MR HEAD ANGIO [place] [facility] [place]    MR NECK ANGIO WO IV CONTRAST  01/06/2016    MR NECK ANGIO WO IV CONTRAST [place] [facility] [place]    OTHER SURGICAL HISTORY  08/19/2020    Strabismus surgery    OTHER SURGICAL HISTORY  08/19/2020    Knee replacement    OTHER SURGICAL HISTORY  02/04/2014    Wrist Carpectomy    SPINE SURGERY  01/2023    1.  L1/L2-L5/S1 bilateral laminectomy facetectomy and foraminotomies  2.  L5-S1 transforaminal lumbar interbody fusion with titanium interbody cage,  local bone graft and allograft chips  3.  T4 to ilium thoracolumbar fusion with Solera instrumentation, local bone  graft, allograft chips, demineralized bone matrix, and bone marrow aspirate 4.  Cement augmentation T4-T5 with Kyphon  cement    TOTAL KNEE ARTHROPLASTY  08/09/2017    Total Knee Arthroplasty       Medications  Current Outpatient Medications on File Prior to Visit   Medication Sig Dispense Refill    acetaminophen (Tylenol) 500 mg tablet Take 2 tablets (1,000 mg) by mouth 2 times a day.      apixaban (Eliquis) 5 mg tablet Take 1 tablet (5 mg) by mouth 2 times a day. 180 tablet 3    atorvastatin (Lipitor) 80 mg tablet Take 1 tablet (80 mg) by mouth once daily. 90 tablet 3    buPROPion XL (Wellbutrin XL) 150 mg 24 hr tablet Take 2 tablets (300 mg) by mouth once daily.      chlorthalidone (Hygroton) 25 mg tablet Take 0.5 tablets (12.5 mg) by mouth once daily. 45 tablet 3    famotidine (Pepcid) 40 mg tablet Take 1 tablet (40 mg) by mouth once daily at bedtime. 90 tablet 1    flecainide (Tambocor) 100 mg tablet Take 3 tablets (300 mg) by mouth 1 time if needed.      gabapentin (Neurontin) 300 mg capsule TAKE 1 CAPSULE DAILY AT BEDTIME IF TOLERATING WELL MAY INCREASE TO 2 CAPSULE DAILY AT BEDTIME 60 capsule 5    levothyroxine (Synthroid, Levoxyl) 75 mcg tablet TAKE 1 TABLET DAILY AND 2 TABLETS ON SUNDAYS 103 tablet 3    metoprolol succinate XL (Toprol-XL) 50 mg 24 hr tablet Take 3 tablets (150 mg) by mouth once daily. 270 tablet 3    multivitamin-children's (Cerovite, Jr) chewable tablet Chew 1 tablet once daily.      omega-3 fatty acids-fish oil (Fish OiL) 340-1,000 mg capsule Take by mouth twice a day.      omeprazole (PriLOSEC) 40 mg DR capsule Take 1 capsule (40 mg) by mouth 2 times a day before meals. 180 capsule 3    sildenafil (Viagra) 50 mg tablet Take 1 tablet (50 mg) by mouth if needed for erectile dysfunction. 30 tablet 6    tamsulosin (Flomax) 0.4 mg 24 hr capsule TAKE 1 CAPSULE ONE TIME DAILY 90 capsule 3     No current facility-administered medications on file prior to visit.       Objective   Physicial Exam  General: Well developed, well nourished, alert and cooperative, appears in no acute distress  Eyes: Non-injected  conjunctiva, sclera clear, no proptosis  Cardiac: Extremities are warm and well perfused. No edema, cyanosis or pallor.   Lungs: Breathing is easy, non-labored. Speaking in clear and complete sentences. Normal diaphragmatic movement.  MSK: Ambulatory with steady gait, unassisted  Neuro: alert and oriented to person, place and time  Psych: Demonstrates good judgement and reason, without hallucinations, abnormal affect or abnormal behaviors.  Skin: no obvious lesions, no rashes.    Appointment on 09/26/2024   Component Date Value Ref Range Status    POC Color, Urine 09/26/2024 Yellow  Straw, Yellow, Light-Yellow In process    POC Appearance, Urine 09/26/2024 Clear  Clear In process    POC Glucose, Urine 09/26/2024 NEGATIVE  NEGATIVE mg/dl In process    POC Bilirubin, Urine 09/26/2024 SMALL (1+) (A)  NEGATIVE In process    POC Ketones, Urine 09/26/2024 NEGATIVE  NEGATIVE mg/dl In process    POC Specific Gravity, Urine 09/26/2024 1.020  1.005 - 1.035 In process    POC Blood, Urine 09/26/2024 NEGATIVE  NEGATIVE In process    POC PH, Urine 09/26/2024 7.0  No Reference Range Established PH In process    POC Protein, Urine 09/26/2024 30 (1+)  NEGATIVE, 30 (1+) mg/dl In process    POC Urobilinogen, Urine 09/26/2024 1.0  0.2, 1.0 EU/DL In process    Poc Nitrite, Urine 09/26/2024 NEGATIVE  NEGATIVE In process    POC Leukocytes, Urine 09/26/2024 NEGATIVE  NEGATIVE In process      Review of Systems  All other systems have been reviewed and are negative for complaint.      Assessment and Plan   Patient presenting to clinic today for FUV. History of BPH with LUTS, TakingTamsulosin 0.4 mg   ED on Sildenafil 50 mg prn. Todays visit he complains of recent bladder pressure, dysuria, difficulty starting urine flow. Does not feel that he is emptying bladder completely.  He was also constipated for approximately 3 days during this time. When constipation resolved, symptoms resolved. Today he denies dysuria, no pressure, but had to strain  slightly to provide urine sample. PVR 0 ml       -BPH with LUTS   Continue Tamsulosin 0.4 mg daily     - Decreased libido  Has not used Viagra recently. Concern that he may have delayed   Erection or inability to ejaculate if he is not fully engaged.   Recommend referral to Dr. Harris, he will consider.     Annual FUV is scheduled with Dr. Bradley in March.     All questions and concerns were addressed. Patient verbalizes understanding and has no other questions at this time.       Michelle Mallory-- VIDHYA JAMES  Office Phone:  [phone]

## 2024-10-08 ENCOUNTER — LAB (OUTPATIENT)
Dept: LAB | Facility: LAB | Age: 74
End: 2024-10-08
Payer: MEDICARE

## 2024-10-08 DIAGNOSIS — E03.9 HYPOTHYROIDISM, UNSPECIFIED TYPE: ICD-10-CM

## 2024-10-08 DIAGNOSIS — I10 BENIGN ESSENTIAL HYPERTENSION: ICD-10-CM

## 2024-10-08 LAB
ALBUMIN SERPL BCP-MCNC: 4.5 G/DL (ref 3.4–5)
ALP SERPL-CCNC: 82 U/L (ref 33–136)
ALT SERPL W P-5'-P-CCNC: 39 U/L (ref 10–52)
ANION GAP SERPL CALC-SCNC: 10 MMOL/L (ref 10–20)
AST SERPL W P-5'-P-CCNC: 29 U/L (ref 9–39)
BASOPHILS # BLD AUTO: 0.08 X10*3/UL (ref 0–0.1)
BASOPHILS NFR BLD AUTO: 1.2 %
BILIRUB SERPL-MCNC: 0.7 MG/DL (ref 0–1.2)
BUN SERPL-MCNC: 14 MG/DL (ref 6–23)
CALCIUM SERPL-MCNC: 9.8 MG/DL (ref 8.6–10.6)
CHLORIDE SERPL-SCNC: 101 MMOL/L (ref 98–107)
CHOLEST SERPL-MCNC: 150 MG/DL (ref 0–199)
CHOLESTEROL/HDL RATIO: 3.3
CO2 SERPL-SCNC: 32 MMOL/L (ref 21–32)
CREAT SERPL-MCNC: 1.04 MG/DL (ref 0.5–1.3)
EGFRCR SERPLBLD CKD-EPI 2021: 75 ML/MIN/1.73M*2
EOSINOPHIL # BLD AUTO: 0.21 X10*3/UL (ref 0–0.4)
EOSINOPHIL NFR BLD AUTO: 3.1 %
ERYTHROCYTE [DISTWIDTH] IN BLOOD BY AUTOMATED COUNT: 12.3 % (ref 11.5–14.5)
GLUCOSE SERPL-MCNC: 88 MG/DL (ref 74–99)
HCT VFR BLD AUTO: 42.1 % (ref 41–52)
HDLC SERPL-MCNC: 45.5 MG/DL
HGB BLD-MCNC: 14.1 G/DL (ref 13.5–17.5)
IMM GRANULOCYTES # BLD AUTO: 0.02 X10*3/UL (ref 0–0.5)
IMM GRANULOCYTES NFR BLD AUTO: 0.3 % (ref 0–0.9)
LDLC SERPL CALC-MCNC: 51 MG/DL
LYMPHOCYTES # BLD AUTO: 2.07 X10*3/UL (ref 0.8–3)
LYMPHOCYTES NFR BLD AUTO: 30.8 %
MCH RBC QN AUTO: 32.6 PG (ref 26–34)
MCHC RBC AUTO-ENTMCNC: 33.5 G/DL (ref 32–36)
MCV RBC AUTO: 97 FL (ref 80–100)
MONOCYTES # BLD AUTO: 0.69 X10*3/UL (ref 0.05–0.8)
MONOCYTES NFR BLD AUTO: 10.3 %
NEUTROPHILS # BLD AUTO: 3.64 X10*3/UL (ref 1.6–5.5)
NEUTROPHILS NFR BLD AUTO: 54.3 %
NON HDL CHOLESTEROL: 105 MG/DL (ref 0–149)
NRBC BLD-RTO: 0 /100 WBCS (ref 0–0)
PLATELET # BLD AUTO: 214 X10*3/UL (ref 150–450)
POTASSIUM SERPL-SCNC: 4.1 MMOL/L (ref 3.5–5.3)
PROT SERPL-MCNC: 7 G/DL (ref 6.4–8.2)
RBC # BLD AUTO: 4.33 X10*6/UL (ref 4.5–5.9)
SODIUM SERPL-SCNC: 139 MMOL/L (ref 136–145)
TRIGL SERPL-MCNC: 270 MG/DL (ref 0–149)
TSH SERPL-ACNC: 5.04 MIU/L (ref 0.44–3.98)
VLDL: 54 MG/DL (ref 0–40)
WBC # BLD AUTO: 6.7 X10*3/UL (ref 4.4–11.3)

## 2024-10-08 PROCEDURE — 80061 LIPID PANEL: CPT

## 2024-10-08 PROCEDURE — 85025 COMPLETE CBC W/AUTO DIFF WBC: CPT

## 2024-10-08 PROCEDURE — 84443 ASSAY THYROID STIM HORMONE: CPT

## 2024-10-08 PROCEDURE — 80053 COMPREHEN METABOLIC PANEL: CPT

## 2024-10-08 PROCEDURE — 36415 COLL VENOUS BLD VENIPUNCTURE: CPT

## 2024-10-09 DIAGNOSIS — E03.9 HYPOTHYROIDISM, UNSPECIFIED TYPE: ICD-10-CM

## 2024-10-09 RX ORDER — LEVOTHYROXINE SODIUM 88 UG/1
88 TABLET ORAL DAILY
Qty: 90 TABLET | Refills: 3 | Status: SHIPPED | OUTPATIENT
Start: 2024-10-09

## 2024-10-09 NOTE — RESULT ENCOUNTER NOTE
Matt,  You have a slight elevation in TSH.  When this occurs, it often means that the amount of thyroid hormone replacement is a bit less than desirable.  Since you are already taking 8 tablets of 75 mcg each week, the next step is to increase the strength of your pills to 88 mcg and ask you to take 1 pill of this strength each day.  Your thyroid tests should be checked in the first quarter of 2025.    I have taken the liberty of sending this new prescription to the pharmacy we have on file for you.    The remainder of your testing is satisfactory.    Do not hesitate to contact me if you have questions or concerns.  Pedro Tesfaye MD

## 2024-10-23 DIAGNOSIS — E78.00 HYPERCHOLESTEROLEMIA: Primary | ICD-10-CM

## 2024-10-23 DIAGNOSIS — I10 BENIGN ESSENTIAL HYPERTENSION: ICD-10-CM

## 2024-10-23 RX ORDER — METOPROLOL SUCCINATE 50 MG/1
TABLET, EXTENDED RELEASE ORAL
Qty: 270 TABLET | Refills: 3 | Status: SHIPPED | OUTPATIENT
Start: 2024-10-23

## 2024-10-23 RX ORDER — CHLORTHALIDONE 25 MG/1
TABLET ORAL
Qty: 45 TABLET | Refills: 3 | Status: SHIPPED | OUTPATIENT
Start: 2024-10-23

## 2024-10-23 RX ORDER — OMEPRAZOLE 40 MG/1
CAPSULE, DELAYED RELEASE ORAL
Qty: 180 CAPSULE | Refills: 3 | Status: SHIPPED | OUTPATIENT
Start: 2024-10-23

## 2024-10-28 RX ORDER — ATORVASTATIN CALCIUM 80 MG/1
80 TABLET, FILM COATED ORAL DAILY
Qty: 90 TABLET | Refills: 3 | Status: SHIPPED | OUTPATIENT
Start: 2024-10-28

## 2024-11-04 ENCOUNTER — OFFICE VISIT (OUTPATIENT)
Dept: OPHTHALMOLOGY | Facility: HOSPITAL | Age: 74
End: 2024-11-04
Payer: MEDICARE

## 2024-11-04 DIAGNOSIS — H53.2 DIPLOPIA: Primary | ICD-10-CM

## 2024-11-04 DIAGNOSIS — H50.10 EXOTROPIA OF BOTH EYES: ICD-10-CM

## 2024-11-04 PROCEDURE — 92060 SENSORIMOTOR EXAMINATION: CPT | Performed by: OPHTHALMOLOGY

## 2024-11-04 PROCEDURE — 92015 DETERMINE REFRACTIVE STATE: CPT | Performed by: OPHTHALMOLOGY

## 2024-11-04 PROCEDURE — 99213 OFFICE O/P EST LOW 20 MIN: CPT | Performed by: OPHTHALMOLOGY

## 2024-11-04 PROCEDURE — 92015 DETERMINE REFRACTIVE STATE: CPT | Mod: MUE | Performed by: OPHTHALMOLOGY

## 2024-11-04 ASSESSMENT — ENCOUNTER SYMPTOMS
NEUROLOGICAL NEGATIVE: 0
HEMATOLOGIC/LYMPHATIC NEGATIVE: 0
ENDOCRINE NEGATIVE: 0
ALLERGIC/IMMUNOLOGIC NEGATIVE: 0
CARDIOVASCULAR NEGATIVE: 0
MUSCULOSKELETAL NEGATIVE: 0
PSYCHIATRIC NEGATIVE: 0
EYES NEGATIVE: 1
GASTROINTESTINAL NEGATIVE: 0
CONSTITUTIONAL NEGATIVE: 0
RESPIRATORY NEGATIVE: 0

## 2024-11-04 ASSESSMENT — REFRACTION_WEARINGRX
OS_CYLINDER: +1.25
OS_ADD: +3.00
OS_VPRISM: 2 BU
OD_CYLINDER: +1.50
OS_HPRISM: 3 BI
OD_HPRISM: 3 BI
OD_AXIS: 004
OD_ADD: +3.00
OS_SPHERE: -2.00
OD_VPRISM: 3 BD
OS_AXIS: 003
SPECS_TYPE: BIFOCAL
OD_SPHERE: -1.50

## 2024-11-04 ASSESSMENT — REFRACTION_MANIFEST
OD_SPHERE: +2.00
OS_CYLINDER: +1.25
OD_AXIS: 005
OS_AXIS: 005
OD_CYLINDER: +1.50
OD_HPRISM: 5 BI
OS_HPRISM: 5 BI
OS_SPHERE: +1.50

## 2024-11-04 ASSESSMENT — REFRACTION_FINALRX
OS_HPRISM: 5 BI
OD_HPRISM: 5 BI

## 2024-11-04 ASSESSMENT — VISUAL ACUITY
OD_CC+: -1
CORRECTION_TYPE: GLASSES
OD_CC: 20/20
OS_CC: 20/20
OS_CC+: -2
METHOD: SNELLEN - LINEAR

## 2024-11-04 ASSESSMENT — SLIT LAMP EXAM - LIDS
COMMENTS: NORMAL
COMMENTS: NORMAL

## 2024-11-04 ASSESSMENT — EXTERNAL EXAM - LEFT EYE: OS_EXAM: NORMAL

## 2024-11-04 ASSESSMENT — EXTERNAL EXAM - RIGHT EYE: OD_EXAM: NORMAL

## 2024-11-19 ENCOUNTER — APPOINTMENT (OUTPATIENT)
Dept: ORTHOPEDIC SURGERY | Facility: HOSPITAL | Age: 74
End: 2024-11-19
Payer: MEDICARE

## 2024-11-20 ENCOUNTER — APPOINTMENT (OUTPATIENT)
Dept: GASTROENTEROLOGY | Facility: EXTERNAL LOCATION | Age: 74
End: 2024-11-20
Payer: MEDICARE

## 2024-11-20 DIAGNOSIS — K22.70 BARRETT'S ESOPHAGUS WITHOUT DYSPLASIA: ICD-10-CM

## 2024-11-20 DIAGNOSIS — R12 HEARTBURN: Primary | ICD-10-CM

## 2024-11-20 DIAGNOSIS — K21.9 GERD WITHOUT ESOPHAGITIS: ICD-10-CM

## 2024-11-20 PROCEDURE — 1157F ADVNC CARE PLAN IN RCRD: CPT | Performed by: INTERNAL MEDICINE

## 2024-11-20 PROCEDURE — 43239 EGD BIOPSY SINGLE/MULTIPLE: CPT | Performed by: INTERNAL MEDICINE

## 2024-11-20 PROCEDURE — 88305 TISSUE EXAM BY PATHOLOGIST: CPT | Performed by: PATHOLOGY

## 2024-11-20 PROCEDURE — 88305 TISSUE EXAM BY PATHOLOGIST: CPT

## 2024-11-20 NOTE — PROGRESS NOTES
Procedure note is completed in [employer]. Report is scanned under 'Media' tab in Epic.    Heartburn better by adding famotidine at bedtime in addition to prilosec bid    Pt is not getting constipated with miralax, no fecal smearing    Crissy Osorio MD, MS

## 2024-11-21 ENCOUNTER — LAB REQUISITION (OUTPATIENT)
Dept: LAB | Facility: HOSPITAL | Age: 74
End: 2024-11-21
Payer: MEDICARE

## 2024-11-24 NOTE — PROGRESS NOTES
[facility]  Hand and Upper Extremity Service  Follow up visit         New Problem: Right thumb     Interval History: He returns for his right thumb. He was previously being seen for ulnar sided wrist pain and received an injection for that symptom. Now he has radial sided pain at the base of his thumb that's aggravated by pinching, squeezing, and grasping. It was worsened after an iv placement during a recent colonoscopy procedure. He's been wearing a cock-up wrist splint previously provided but doesn't believe this is helping improve much of the pain.               Past medical history, medications, allergies, surgical history and review of systems are reviewed and otherwise unchanged when compared to last visit on 8/29/23         Examination:  Constitutional: Oriented to person, place, and time.  Appears well-developed and well-nourished.  Head: Normocephalic and atraumatic.  Eyes: Pupils are equal, round, and reactive to light.  Cardiovascular: Intact distal pulses.  Pulmonary/Chest/Breast: Effort normal. No respiratory distress.  Neurological: Alert and oriented to person, place, and time.  Skin: Skin is warm and dry.  Psychiatric: normal mood and affect.  Behavior is normal.  Musculoskeletal: Right hand reveals minima degenerative changes. Minimal tenderness to palpation around ulnar head. No pain dorsally across the radiocarpal joint. Mild pain over first dorsal compartment. More significant pain with palpation and manipulation of thumb CMC joint.       Personal Interpretation of Diagnostic studies: X-rays of right wrist taken today demonstrate mild diffuse degenerative changes with joint space narrowing of thumb CMC joint.        Impression: Right thumb CMC joint arthritis       Plan: We've provided him with a more appropriate functional neoprene brace for his thumb today. He also received a right thumb CMC injection and will follow up as needed.       In Office Procedures  Performed: Right thumb CMC injection   S Inj/Asp: R thumb CMC on 11/26/2024 2:20 PM  Indications: pain  Details: 25 G needle, dorsal approach  Medications: 20 mg triamcinolone acetonide 40 mg/mL; 0.5 mL lidocaine 10 mg/mL (1 %)  Outcome: tolerated well, no immediate complications  Procedure, treatment alternatives, risks and benefits explained, specific risks discussed. Consent was given by the patient. Immediately prior to procedure a time out was called to verify the correct patient, procedure, equipment, support staff and site/side marked as required. Patient was prepped and draped in the usual sterile fashion.         Patient was prescribed a Comfort Cool for CMC arthritis. The patient has weakness, instability and/or deformity of their right thumb which requires stabilization from this orthosis to improve their function.      Verbal and written instructions for the use, wear schedule, cleaning and application of this item were given.  Patient was instructed that should the brace result in increased pain, decreased sensation, increased swelling, or an overall worsening of their medical condition, to please contact our office immediately.     Orthotic management and training was provided for skin care, modifications due to healing tissues, edema changes, interruption in skin integrity, and safety precautions with the orthosis.        Follow up: As needed             Jayden Palacios MD  [facility]  Department of Orthopaedic Surgery  Hand and Upper Extremity Reconstruction    Scribe Attestation  By signing my name below, IDanya , Scribsheila   attest that this documentation has been prepared under the direction and in the presence of Dr. Jayden Palacios.    Dictation performed with the use of voice recognition software.  Syntax and grammatical errors may exist.

## 2024-11-25 DIAGNOSIS — G89.29 WRIST PAIN, CHRONIC, RIGHT: Primary | ICD-10-CM

## 2024-11-25 DIAGNOSIS — M25.531 WRIST PAIN, CHRONIC, RIGHT: Primary | ICD-10-CM

## 2024-11-26 ENCOUNTER — HOSPITAL ENCOUNTER (OUTPATIENT)
Dept: RADIOLOGY | Facility: HOSPITAL | Age: 74
Discharge: HOME | End: 2024-11-26
Payer: MEDICARE

## 2024-11-26 ENCOUNTER — OFFICE VISIT (OUTPATIENT)
Dept: ORTHOPEDIC SURGERY | Facility: HOSPITAL | Age: 74
End: 2024-11-26
Payer: MEDICARE

## 2024-11-26 VITALS — BODY MASS INDEX: 33.8 KG/M2 | HEIGHT: 64 IN | WEIGHT: 198 LBS

## 2024-11-26 DIAGNOSIS — M25.531 WRIST PAIN, CHRONIC, RIGHT: ICD-10-CM

## 2024-11-26 DIAGNOSIS — G89.29 WRIST PAIN, CHRONIC, RIGHT: ICD-10-CM

## 2024-11-26 DIAGNOSIS — M19.049 CMC ARTHRITIS: Primary | ICD-10-CM

## 2024-11-26 PROCEDURE — 99213 OFFICE O/P EST LOW 20 MIN: CPT | Performed by: ORTHOPAEDIC SURGERY

## 2024-11-26 PROCEDURE — 1159F MED LIST DOCD IN RCRD: CPT | Performed by: ORTHOPAEDIC SURGERY

## 2024-11-26 PROCEDURE — 73110 X-RAY EXAM OF WRIST: CPT | Mod: RT

## 2024-11-26 PROCEDURE — L3924 HFO WITHOUT JOINTS PRE OTS: HCPCS | Performed by: ORTHOPAEDIC SURGERY

## 2024-11-26 PROCEDURE — 1157F ADVNC CARE PLAN IN RCRD: CPT | Performed by: ORTHOPAEDIC SURGERY

## 2024-11-26 PROCEDURE — 3008F BODY MASS INDEX DOCD: CPT | Performed by: ORTHOPAEDIC SURGERY

## 2024-11-26 PROCEDURE — 99213 OFFICE O/P EST LOW 20 MIN: CPT | Mod: 25 | Performed by: ORTHOPAEDIC SURGERY

## 2024-11-26 PROCEDURE — 73110 X-RAY EXAM OF WRIST: CPT | Mod: RIGHT SIDE | Performed by: RADIOLOGY

## 2024-11-26 PROCEDURE — 2500000004 HC RX 250 GENERAL PHARMACY W/ HCPCS (ALT 636 FOR OP/ED): Performed by: ORTHOPAEDIC SURGERY

## 2024-11-26 PROCEDURE — 1036F TOBACCO NON-USER: CPT | Performed by: ORTHOPAEDIC SURGERY

## 2024-11-26 PROCEDURE — 20600 DRAIN/INJ JOINT/BURSA W/O US: CPT | Mod: RT | Performed by: ORTHOPAEDIC SURGERY

## 2024-11-26 RX ORDER — LIDOCAINE HYDROCHLORIDE 10 MG/ML
0.5 INJECTION, SOLUTION INFILTRATION; PERINEURAL
Status: COMPLETED | OUTPATIENT
Start: 2024-11-26 | End: 2024-11-26

## 2024-11-26 RX ORDER — TRIAMCINOLONE ACETONIDE 40 MG/ML
20 INJECTION, SUSPENSION INTRA-ARTICULAR; INTRAMUSCULAR
Status: COMPLETED | OUTPATIENT
Start: 2024-11-26 | End: 2024-11-26

## 2024-11-26 ASSESSMENT — PAIN DESCRIPTION - DESCRIPTORS: DESCRIPTORS: ACHING;SORE

## 2024-11-26 ASSESSMENT — PAIN SCALES - GENERAL: PAINLEVEL_OUTOF10: 5 - MODERATE PAIN

## 2024-11-26 ASSESSMENT — PAIN - FUNCTIONAL ASSESSMENT: PAIN_FUNCTIONAL_ASSESSMENT: 0-10

## 2024-11-27 LAB
LABORATORY COMMENT REPORT: NORMAL
PATH REPORT.FINAL DX SPEC: NORMAL
PATH REPORT.GROSS SPEC: NORMAL
PATH REPORT.RELEVANT HX SPEC: NORMAL
PATH REPORT.TOTAL CANCER: NORMAL

## 2024-12-04 ENCOUNTER — LAB (OUTPATIENT)
Dept: LAB | Facility: LAB | Age: 74
End: 2024-12-04
Payer: MEDICARE

## 2024-12-04 ENCOUNTER — APPOINTMENT (OUTPATIENT)
Dept: PRIMARY CARE | Facility: CLINIC | Age: 74
End: 2024-12-04
Payer: MEDICARE

## 2024-12-04 VITALS
BODY MASS INDEX: 33.97 KG/M2 | OXYGEN SATURATION: 97 % | DIASTOLIC BLOOD PRESSURE: 88 MMHG | TEMPERATURE: 97.6 F | SYSTOLIC BLOOD PRESSURE: 133 MMHG | HEART RATE: 65 BPM | WEIGHT: 199 LBS | HEIGHT: 64 IN

## 2024-12-04 DIAGNOSIS — G89.29 CHRONIC BILATERAL LOW BACK PAIN WITH BILATERAL SCIATICA: Primary | ICD-10-CM

## 2024-12-04 DIAGNOSIS — M54.42 CHRONIC BILATERAL LOW BACK PAIN WITH BILATERAL SCIATICA: Primary | ICD-10-CM

## 2024-12-04 DIAGNOSIS — M54.41 CHRONIC BILATERAL LOW BACK PAIN WITH BILATERAL SCIATICA: Primary | ICD-10-CM

## 2024-12-04 DIAGNOSIS — I48.0 PAROXYSMAL ATRIAL FIBRILLATION (MULTI): ICD-10-CM

## 2024-12-04 DIAGNOSIS — I10 BENIGN ESSENTIAL HYPERTENSION: ICD-10-CM

## 2024-12-04 DIAGNOSIS — N40.1 BPH WITH OBSTRUCTION/LOWER URINARY TRACT SYMPTOMS: ICD-10-CM

## 2024-12-04 DIAGNOSIS — C44.229 SQUAMOUS CELL CARCINOMA OF SKIN OF LEFT EAR AND EXTERNAL AURICULAR CANAL: ICD-10-CM

## 2024-12-04 DIAGNOSIS — M48.02 CERVICAL SPINAL STENOSIS: ICD-10-CM

## 2024-12-04 DIAGNOSIS — E03.9 HYPOTHYROIDISM, UNSPECIFIED TYPE: ICD-10-CM

## 2024-12-04 DIAGNOSIS — G95.9 CERVICAL MYELOPATHY: ICD-10-CM

## 2024-12-04 DIAGNOSIS — N13.8 BPH WITH OBSTRUCTION/LOWER URINARY TRACT SYMPTOMS: ICD-10-CM

## 2024-12-04 DIAGNOSIS — K22.70 BARRETT'S ESOPHAGUS WITHOUT DYSPLASIA: ICD-10-CM

## 2024-12-04 PROBLEM — M48.062 LUMBAR STENOSIS WITH NEUROGENIC CLAUDICATION: Status: RESOLVED | Noted: 2023-10-21 | Resolved: 2024-12-04

## 2024-12-04 PROBLEM — H91.93 HEARING LOSS, BILATERAL: Status: RESOLVED | Noted: 2023-10-21 | Resolved: 2024-12-04

## 2024-12-04 PROBLEM — M65.4 DE QUERVAIN'S TENOSYNOVITIS: Status: RESOLVED | Noted: 2023-10-21 | Resolved: 2024-12-04

## 2024-12-04 PROBLEM — M40.30 FLAT-BACK SYNDROME: Status: RESOLVED | Noted: 2023-08-30 | Resolved: 2024-12-04

## 2024-12-04 PROBLEM — M65.331 TRIGGER MIDDLE FINGER OF RIGHT HAND: Status: RESOLVED | Noted: 2023-10-21 | Resolved: 2024-12-04

## 2024-12-04 PROBLEM — M70.72 HIP BURSITIS, LEFT: Status: RESOLVED | Noted: 2023-10-21 | Resolved: 2024-12-04

## 2024-12-04 PROBLEM — M76.899 TENDINITIS OF HIP: Status: RESOLVED | Noted: 2023-10-21 | Resolved: 2024-12-04

## 2024-12-04 PROBLEM — R39.89 OTHER SYMPTOMS AND SIGNS INVOLVING THE GENITOURINARY SYSTEM: Status: RESOLVED | Noted: 2023-10-21 | Resolved: 2024-12-04

## 2024-12-04 PROBLEM — M40.202 CERVICAL KYPHOSIS: Status: RESOLVED | Noted: 2023-10-21 | Resolved: 2024-12-04

## 2024-12-04 PROBLEM — L03.011 PARONYCHIA OF FINGER OF RIGHT HAND: Status: RESOLVED | Noted: 2023-10-21 | Resolved: 2024-12-04

## 2024-12-04 PROBLEM — H35.073 TYPE 2 MACULAR TELANGIECTASIS OF BOTH EYES: Status: RESOLVED | Noted: 2023-10-21 | Resolved: 2024-12-04

## 2024-12-04 PROBLEM — H52.13 MYOPIA WITH PRESBYOPIA OF BOTH EYES: Status: RESOLVED | Noted: 2023-10-21 | Resolved: 2024-12-04

## 2024-12-04 PROBLEM — Z00.00 PREVENTATIVE HEALTH CARE: Status: RESOLVED | Noted: 2024-09-09 | Resolved: 2024-12-04

## 2024-12-04 PROBLEM — M71.9 DISORDER OF BURSAE AND TENDONS IN LEFT SHOULDER REGION: Status: RESOLVED | Noted: 2023-10-21 | Resolved: 2024-12-04

## 2024-12-04 PROBLEM — M17.12 PRIMARY OSTEOARTHRITIS OF LEFT KNEE: Status: RESOLVED | Noted: 2023-10-21 | Resolved: 2024-12-04

## 2024-12-04 PROBLEM — R73.03 PREDIABETES: Status: RESOLVED | Noted: 2023-10-21 | Resolved: 2024-12-04

## 2024-12-04 PROBLEM — M67.912 DISORDER OF BURSAE AND TENDONS IN LEFT SHOULDER REGION: Status: RESOLVED | Noted: 2023-10-21 | Resolved: 2024-12-04

## 2024-12-04 PROBLEM — N39.41 URGE INCONTINENCE: Status: RESOLVED | Noted: 2023-10-21 | Resolved: 2024-12-04

## 2024-12-04 PROBLEM — M54.12 CERVICAL RADICULOPATHY: Status: RESOLVED | Noted: 2023-10-21 | Resolved: 2024-12-04

## 2024-12-04 PROBLEM — R06.02 SHORTNESS OF BREATH: Status: RESOLVED | Noted: 2023-10-21 | Resolved: 2024-12-04

## 2024-12-04 PROBLEM — M65.332 TRIGGER MIDDLE FINGER OF LEFT HAND: Status: RESOLVED | Noted: 2023-10-21 | Resolved: 2024-12-04

## 2024-12-04 PROBLEM — H50.112: Status: RESOLVED | Noted: 2023-10-21 | Resolved: 2024-12-04

## 2024-12-04 PROBLEM — F32.A DEPRESSION: Status: RESOLVED | Noted: 2023-08-30 | Resolved: 2024-12-04

## 2024-12-04 PROBLEM — M41.9 KYPHOSCOLIOSIS: Status: RESOLVED | Noted: 2023-10-21 | Resolved: 2024-12-04

## 2024-12-04 PROBLEM — M65.322 TRIGGER INDEX FINGER OF LEFT HAND: Status: RESOLVED | Noted: 2023-10-21 | Resolved: 2024-12-04

## 2024-12-04 PROBLEM — H52.03 HYPERMETROPIA OF BOTH EYES: Status: RESOLVED | Noted: 2023-10-21 | Resolved: 2024-12-04

## 2024-12-04 PROBLEM — H25.813 COMBINED FORMS OF AGE-RELATED CATARACT OF BOTH EYES: Status: RESOLVED | Noted: 2023-10-21 | Resolved: 2024-12-04

## 2024-12-04 PROBLEM — M17.0 LOCALIZED PRIMARY OSTEOARTHRITIS OF BOTH LOWER LEGS: Status: RESOLVED | Noted: 2023-10-21 | Resolved: 2024-12-04

## 2024-12-04 PROBLEM — M19.039 WRIST ARTHRITIS: Status: RESOLVED | Noted: 2023-10-21 | Resolved: 2024-12-04

## 2024-12-04 PROBLEM — H53.001 AMBLYOPIA OF RIGHT EYE: Status: RESOLVED | Noted: 2023-10-21 | Resolved: 2024-12-04

## 2024-12-04 PROBLEM — K43.9 LATERAL VENTRAL HERNIA: Status: RESOLVED | Noted: 2023-10-21 | Resolved: 2024-12-04

## 2024-12-04 PROBLEM — M77.12 LATERAL EPICONDYLITIS OF LEFT ELBOW: Status: RESOLVED | Noted: 2023-10-21 | Resolved: 2024-12-04

## 2024-12-04 PROBLEM — R29.890 HEIGHT LOSS: Status: RESOLVED | Noted: 2023-10-21 | Resolved: 2024-12-04

## 2024-12-04 PROBLEM — Z96.1 PSEUDOPHAKIA OF LEFT EYE: Status: RESOLVED | Noted: 2023-10-21 | Resolved: 2024-12-04

## 2024-12-04 PROBLEM — M43.12 SPONDYLOLISTHESIS OF CERVICAL REGION: Status: RESOLVED | Noted: 2023-10-21 | Resolved: 2024-12-04

## 2024-12-04 PROBLEM — M16.12 OSTEOARTHRITIS OF LEFT HIP: Status: RESOLVED | Noted: 2023-10-21 | Resolved: 2024-12-04

## 2024-12-04 PROBLEM — M11.269 CHONDROCALCINOSIS DUE TO DICALCIUM PHOSPHATE CRYSTALS, OF THE KNEE: Status: RESOLVED | Noted: 2023-08-30 | Resolved: 2024-12-04

## 2024-12-04 PROBLEM — H52.00 HYPEROPIA: Status: RESOLVED | Noted: 2023-10-21 | Resolved: 2024-12-04

## 2024-12-04 PROBLEM — H90.3 BILATERAL SENSORINEURAL HEARING LOSS: Status: RESOLVED | Noted: 2023-08-30 | Resolved: 2024-12-04

## 2024-12-04 PROBLEM — N52.9 MALE ERECTILE DISORDER: Status: RESOLVED | Noted: 2023-10-21 | Resolved: 2024-12-04

## 2024-12-04 PROBLEM — R63.4 ABNORMAL WEIGHT LOSS: Status: RESOLVED | Noted: 2023-10-21 | Resolved: 2024-12-04

## 2024-12-04 PROBLEM — M65.321 TRIGGER INDEX FINGER OF RIGHT HAND: Status: RESOLVED | Noted: 2023-10-21 | Resolved: 2024-12-04

## 2024-12-04 PROBLEM — F41.9 ANXIETY DISORDER: Status: RESOLVED | Noted: 2023-08-30 | Resolved: 2024-12-04

## 2024-12-04 PROBLEM — R22.0 LIP SWELLING: Status: RESOLVED | Noted: 2023-10-21 | Resolved: 2024-12-04

## 2024-12-04 PROBLEM — M70.62 TROCHANTERIC BURSITIS OF LEFT HIP: Status: RESOLVED | Noted: 2023-10-21 | Resolved: 2024-12-04

## 2024-12-04 PROBLEM — E78.00 HYPERCHOLESTEROLEMIA: Status: RESOLVED | Noted: 2023-08-30 | Resolved: 2024-12-04

## 2024-12-04 PROBLEM — H53.2 MONOCULAR DIPLOPIA OF LEFT EYE: Status: RESOLVED | Noted: 2023-08-30 | Resolved: 2024-12-04

## 2024-12-04 PROBLEM — G47.33 OBSTRUCTIVE SLEEP APNEA: Status: RESOLVED | Noted: 2023-10-21 | Resolved: 2024-12-04

## 2024-12-04 PROBLEM — H52.4 MYOPIA WITH PRESBYOPIA OF BOTH EYES: Status: RESOLVED | Noted: 2023-10-21 | Resolved: 2024-12-04

## 2024-12-04 LAB
T4 FREE SERPL-MCNC: 1.42 NG/DL (ref 0.78–1.48)
TSH SERPL-ACNC: 4.45 MIU/L (ref 0.44–3.98)

## 2024-12-04 PROCEDURE — 3008F BODY MASS INDEX DOCD: CPT | Performed by: STUDENT IN AN ORGANIZED HEALTH CARE EDUCATION/TRAINING PROGRAM

## 2024-12-04 PROCEDURE — 1036F TOBACCO NON-USER: CPT | Performed by: STUDENT IN AN ORGANIZED HEALTH CARE EDUCATION/TRAINING PROGRAM

## 2024-12-04 PROCEDURE — 1157F ADVNC CARE PLAN IN RCRD: CPT | Performed by: STUDENT IN AN ORGANIZED HEALTH CARE EDUCATION/TRAINING PROGRAM

## 2024-12-04 PROCEDURE — 84439 ASSAY OF FREE THYROXINE: CPT

## 2024-12-04 PROCEDURE — 3079F DIAST BP 80-89 MM HG: CPT | Performed by: STUDENT IN AN ORGANIZED HEALTH CARE EDUCATION/TRAINING PROGRAM

## 2024-12-04 PROCEDURE — 3075F SYST BP GE 130 - 139MM HG: CPT | Performed by: STUDENT IN AN ORGANIZED HEALTH CARE EDUCATION/TRAINING PROGRAM

## 2024-12-04 PROCEDURE — 84443 ASSAY THYROID STIM HORMONE: CPT

## 2024-12-04 PROCEDURE — 36415 COLL VENOUS BLD VENIPUNCTURE: CPT

## 2024-12-04 PROCEDURE — 1125F AMNT PAIN NOTED PAIN PRSNT: CPT | Performed by: STUDENT IN AN ORGANIZED HEALTH CARE EDUCATION/TRAINING PROGRAM

## 2024-12-04 PROCEDURE — 99214 OFFICE O/P EST MOD 30 MIN: CPT | Performed by: STUDENT IN AN ORGANIZED HEALTH CARE EDUCATION/TRAINING PROGRAM

## 2024-12-04 ASSESSMENT — PAIN SCALES - GENERAL: PAINLEVEL_OUTOF10: 4

## 2024-12-04 NOTE — PATIENT INSTRUCTIONS
Please stop at any  lab ([address] if you choose to use my building) to complete your blood and/or urine work that I've ordered for you.    I will contact you with the results at my soonest convenience. I strongly urge you to use [employer] as this is the quickest and easiest way to access your results and receive my correspondences.      I have referred you to pain management for assistance in treating your chronic pain. Call [phone] to schedule this.      Follow up with your specialists as previously scheduled.     See me in 6 months.

## 2024-12-04 NOTE — PROGRESS NOTES
"Subjective   Patient ID: Matt Huber is a 74 y.o. male who presents for No chief complaint on file..    HPI   Patient routinely sees Dr. Pedro Tesfaye for care; in with me today for scheduling reasons. Transitioning to my care.    Re: back - Subacute on chronic onset of back pain. Mild-moderate in intensity, locks up on occasion. Pain is all over but worst in lower back, with some radiation into the legs down into the feet. Used some conservative therapy (NSAIDs, stretching, heat/ice, etc...) but without regularity. Still in with PT; also on gabapentin. See ortho and pain mgmt notes.     Re: GI - has hx of Pitts's with some reflux. Takes meds regularly. Occasionally symptomatic with reflux.    Re: CV - see cardiology notes. BP at goal. Has hx of paroxysmal Afib for which he is on DOAC. No bleeding complications. Reports no sx high or low from HTN; denies blurry vision, HA, dizziness LoC CP SoB Dupree and leg swelling.     Re: vision - chronic vision changes. See ophtho notes.     Re: hypothyroid - remote diagnosis. Recent uptitration of his Levothyroxine to 88mcg. Due for repeat TSH in 2025.     PMHx, FHx, Social Hx, Surg Hx personally reviewed at this appointment. No pertinent findings and/or changes from prior (if applicable).    ROS: Denies wt gain/loss f/c HA LoC CP SOB NVDC. See HPI above, and scanned sheet (if applicable). All other systems are reviewed and are without complaint.     Review of Systems    Objective   /88   Pulse 65   Temp 36.4 °C (97.6 °F)   Ht 1.626 m (5' 4\")   Wt 90.3 kg (199 lb)   SpO2 97%   BMI 34.16 kg/m²     Physical Exam  Gen: elderly appearance. AAO x 3  HEENT: NC/AT. Anicteric sclera, symmetric pupils.   Neck: Soft, supple. No LAD. No goiter.   CV: RRR nl s1s2 no m/r/g  Pulm: CTAB no w/r/r, good air exchange  GI: soft NTND BS+ no hsm  Ext: WWP no edema  Neuro: II-XII grossly intact, nonfocal systemic findings  MSK: 5/5 strength b/l UE and LE  Gait: unremarkable      Lab " Results   Component Value Date    WBC 6.7 10/08/2024    HGB 14.1 10/08/2024    HCT 42.1 10/08/2024     10/08/2024    CHOL 150 10/08/2024    TRIG 270 (H) 10/08/2024    HDL 45.5 10/08/2024    ALT 39 10/08/2024    AST 29 10/08/2024     10/08/2024    K 4.1 10/08/2024     10/08/2024    CREATININE 1.04 10/08/2024    BUN 14 10/08/2024    CO2 32 10/08/2024    TSH 5.04 (H) 10/08/2024    PSA 1.16 10/22/2018    INR 1.1 01/16/2023    HGBA1C 6.0 09/09/2024     par    XR wrist right 3+ views  Narrative: Interpreted By:  Tom Ryan,   STUDY:  XR WRIST RIGHT 3+ VIEWS      INDICATION:  Signs/Symptoms:Pain.      COMPARISON:  August 29, 2023      ACCESSION NUMBER(S):  [id number]      ORDERING CLINICIAN:  FELIPE TOMLINSON      FINDINGS:  Mild degenerative changes of the right wrist.      Small amount of chondrocalcinosis possibly a component of pseudogout.  No evidence of fracture.      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      Signed by: Tom Ryan 11/27/2024 5:40 PM  Dictation workstation:   [id number]      Current Outpatient Medications   Medication Instructions    acetaminophen (TYLENOL) 1,000 mg, oral, 2 times daily    apixaban (ELIQUIS) 5 mg, oral, 2 times daily    atorvastatin (LIPITOR) 80 mg, oral, Daily    buPROPion XL (Wellbutrin XL) 150 mg 24 hr tablet 2 tablets, oral, Daily    chlorthalidone (Hygroton) 25 mg tablet TAKE 1/2 TABLET ONE TIME DAILY    famotidine (PEPCID) 40 mg, oral, Nightly    flecainide (TAMBOCOR) 300 mg, oral, Once as needed    gabapentin (Neurontin) 300 mg capsule TAKE 1 CAPSULE DAILY AT BEDTIME IF TOLERATING WELL MAY INCREASE TO 2 CAPSULE DAILY AT BEDTIME    levothyroxine (SYNTHROID, LEVOXYL) 88 mcg, oral, Daily    metoprolol succinate XL (Toprol-XL) 50 mg 24 hr tablet TAKE 3 TABLETS ONE TIME DAILY    multivitamin-children's (Cerovite, Jr) chewable tablet 1 tablet, oral, Daily    omega-3 fatty acids-fish oil (Fish OiL) 340-1,000 mg capsule  oral, 2 times daily    omeprazole (PriLOSEC) 40 mg DR capsule TAKE 1 CAPSULE TWICE DAILY BEFORE MEALS    sildenafil (VIAGRA) 50 mg, oral, As needed    tamsulosin (FLOMAX) 0.4 mg, oral, Daily        Assessment/Plan   Overall stable, still dealing with pain. Will refer to pain mgmt for further assistance; consider injections    # HTN: at/near goal  - continue current regimen  - routine lab work if not recent  - continue lifestyle modifications    # h/o Afib: NSR today  - rate control with BB  - continue AC as indicated  - f/u cardiology     # hypothyroidism  - TSH with reflex to free T4  - will adjust meds based on lab values     # Chronic back pain: persists despite extensive surgery  - Pain mgmt referral    # Skin cancers  - follow up dermatology    # Vision issues: chronic  - follow up ophtho    # Health Maintenance  - routine blood work  - Colon Cancer Screening: UTD  - PSA: no longer indicated due to age   - Immunizations: UTD  - AAA screening:  completed in past

## 2024-12-05 ENCOUNTER — OFFICE VISIT (OUTPATIENT)
Dept: ORTHOPEDIC SURGERY | Facility: HOSPITAL | Age: 74
End: 2024-12-05
Payer: MEDICARE

## 2024-12-05 DIAGNOSIS — M25.512 LEFT SHOULDER PAIN, UNSPECIFIED CHRONICITY: Primary | ICD-10-CM

## 2024-12-05 PROCEDURE — 20610 DRAIN/INJ JOINT/BURSA W/O US: CPT | Mod: RT | Performed by: ORTHOPAEDIC SURGERY

## 2024-12-05 PROCEDURE — 99213 OFFICE O/P EST LOW 20 MIN: CPT | Performed by: ORTHOPAEDIC SURGERY

## 2024-12-05 PROCEDURE — 2500000004 HC RX 250 GENERAL PHARMACY W/ HCPCS (ALT 636 FOR OP/ED): Performed by: ORTHOPAEDIC SURGERY

## 2024-12-07 RX ORDER — TRIAMCINOLONE ACETONIDE 40 MG/ML
40 INJECTION, SUSPENSION INTRA-ARTICULAR; INTRAMUSCULAR
Status: COMPLETED | OUTPATIENT
Start: 2024-12-05 | End: 2024-12-05

## 2024-12-07 RX ORDER — LIDOCAINE HYDROCHLORIDE 10 MG/ML
4 INJECTION, SOLUTION INFILTRATION; PERINEURAL
Status: COMPLETED | OUTPATIENT
Start: 2024-12-05 | End: 2024-12-05

## 2024-12-10 NOTE — RESULT ENCOUNTER NOTE
Dear Mr. Huber:    This message is to inform you the result of the biopsies taken during your recent EGD (upper endoscopy).    The biopsies from your esophagus were normal. Specifically there was no signs of inflammation or Pitts's esophagus.      We hope this information is helpful to you. Your health and the quality care you receive are important to us. Please call our office should you have any questions or concerns.    Sincerely,    Crissy Osorio MD [facility]  Phone: [phone], option 6  Fax: [phone]
Dear Patient,    GOOD NEWS    Attached to this note is a copy of the testing that I have ordered.The results indicate that there are no significant abnormalities in your results, even if some of the findings are reported as abnormal.    Please continue your current treatment plan as we have discussed and return for follow up as planned.    Do not hesitate to contact me if you have any questions or concerns.    Sincerely,  Pedro Tesfaye M.D.  
8

## 2025-01-28 ENCOUNTER — OFFICE VISIT (OUTPATIENT)
Dept: PAIN MEDICINE | Facility: HOSPITAL | Age: 75
End: 2025-01-28
Payer: MEDICARE

## 2025-01-28 DIAGNOSIS — G89.29 CHRONIC BILATERAL LOW BACK PAIN WITH BILATERAL SCIATICA: ICD-10-CM

## 2025-01-28 DIAGNOSIS — M54.41 CHRONIC BILATERAL LOW BACK PAIN WITH BILATERAL SCIATICA: ICD-10-CM

## 2025-01-28 DIAGNOSIS — M54.42 CHRONIC BILATERAL LOW BACK PAIN WITH BILATERAL SCIATICA: ICD-10-CM

## 2025-01-28 DIAGNOSIS — M47.817 LUMBOSACRAL SPONDYLOSIS WITHOUT MYELOPATHY: ICD-10-CM

## 2025-01-28 PROCEDURE — 1157F ADVNC CARE PLAN IN RCRD: CPT | Performed by: ANESTHESIOLOGY

## 2025-01-28 PROCEDURE — 1125F AMNT PAIN NOTED PAIN PRSNT: CPT | Performed by: ANESTHESIOLOGY

## 2025-01-28 PROCEDURE — 99204 OFFICE O/P NEW MOD 45 MIN: CPT | Performed by: ANESTHESIOLOGY

## 2025-01-28 PROCEDURE — 99214 OFFICE O/P EST MOD 30 MIN: CPT | Performed by: ANESTHESIOLOGY

## 2025-01-28 ASSESSMENT — PAIN SCALES - GENERAL: PAINLEVEL_OUTOF10: 6

## 2025-01-28 ASSESSMENT — PAIN - FUNCTIONAL ASSESSMENT: PAIN_FUNCTIONAL_ASSESSMENT: 0-10

## 2025-01-28 NOTE — PROGRESS NOTES
Chief Complaint   Patient presents with    Back Pain     73 y/o male c/o upper and lower back pain        HPI   Matt Huber is a 74-year-old male with a history of back pain.  He has followed closely with his surgeon Dr. Fitzpatrick.  The patient presents today complaining of thoracic pain which is between the shoulder blades and below.  Approximately T4-8 area.  The patient says that he is markedly better than before his surgery.  Used to have thoracic radicular symptoms and significant pain into the legs.  He says he was essentially nonfunctional before his surgery.  He now has more focal pain in the thoracic area as mentioned above and on occasion will have some neuropathic symptoms in the feet particularly on the left.  He says that aggravating factors can be raising his arms over his head, lifting things that are more than 20 to 25 pounds, twisting, and standing for too long.  He says that he can achieve some improvement in his symptoms with sitting on the couch with a heating pad.  At times changing positions can make things better.  Tylenol helps on occasion.  He has used ice which may help short-term.  At night he can have worsened symptoms in the distal extremity where his foot will feel numb but also painful.  On average his pain is a 6-8 out of 10.  He notes that the symptoms are an ache, and deep type pain.  At times he will feel fatigued like he has done a lot of exercise even if he has not done anything.  After his surgery he did formal physical therapy, at least 2 rounds and does some of those stretches and exercises ongoing a couple times a week but is not as diligent as he has been in the past.  2 rounds of formal physical therapy have been completed since January 2023 which is when he had surgery.    Patient had prior thoracolumbar decompression and fusion.  Patient has followed with Dr. Fitzpatrick.  The patient had T4 to ilium thoracic a lumbar fusion and a L1-S1 bilateral laminectomy, facetectomy,  foraminotomies, and L5-S1 TLIF.    Patient takes Eliquis.    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, of the knee 08/30/2023    Displaced fracture of coronoid process of left ulna, initial encounter for closed fracture 04/22/2015    Fracture of coronoid process of left ulna    Exotropia of right eye     Hypermetropia of both eyes     Hypertension     Lumbar stenosis with neurogenic claudication 10/21/2023    Medial epicondylitis, left elbow 04/13/2015    Medial epicondylitis of left elbow    Monocular diplopia     Obstructive sleep apnea 10/21/2023    Osteoarthritis of left hip 10/21/2023    Other chondrocalcinosis, right knee 08/05/2015    Chondrocalcinosis of knee, right    Other enthesopathies, not elsewhere classified 03/31/2014    Tendonitis of shoulder    Other enthesopathies, not elsewhere classified 05/22/2015    Tendonitis of elbow, left    Pain in left knee 01/10/2020    Left knee pain    Pain in unspecified hip 02/24/2016    Hip pain    Pain in unspecified shoulder 03/18/2014    Shoulder pain    Paroxysmal atrial fibrillation (Multi) 11/22/2022    Paroxysmal atrial fibrillation    Personal history of other diseases of the circulatory system 01/31/2014    History of mitral valve insufficiency    Personal history of other diseases of the musculoskeletal system and connective tissue 12/30/2014    History of trigger finger    Prediabetes 10/21/2023    Presbyopia of both eyes     Strabismus     Type 2 macular telangiectasis     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    Unilateral primary  osteoarthritis, left knee 01/21/2019    Primary localized osteoarthrosis of left lower leg    Unspecified acute lower respiratory infection 06/10/2016    Acute lower respiratory tract infection    Unspecified injury of left wrist, hand and finger(s), initial encounter 05/22/2015    Left wrist injury    Unspecified injury of unspecified elbow, initial encounter 05/22/2015    Elbow injury       Past Surgical History:   Procedure Laterality Date    BACK SURGERY  02/04/2014    Back Surgery    CATARACT EXTRACTION W/  INTRAOCULAR LENS IMPLANT Left 09/13/2023    Dr Tsang    CATARACT EXTRACTION W/  INTRAOCULAR LENS IMPLANT Right 08/22/2023    Dr Tsang    KNEE SURGERY  02/04/2014    Knee Surgery Right    KNEE SURGERY  02/04/2014    Knee Surgery Left    MR HEAD ANGIO WO IV CONTRAST  01/06/2016    MR HEAD ANGIO WO IV CONTRAST 1/6/2016 [facility]    MR NECK ANGIO WO IV CONTRAST  01/06/2016    MR NECK ANGIO WO IV CONTRAST 1/6/2016 [facility]    OTHER SURGICAL HISTORY  08/19/2020    Strabismus surgery    OTHER SURGICAL HISTORY  08/19/2020    Knee replacement    OTHER SURGICAL HISTORY  02/04/2014    Wrist Carpectomy    SPINE SURGERY  01/2023    1.  L1/L2-L5/S1 bilateral laminectomy facetectomy and foraminotomies  2.  L5-S1 transforaminal lumbar interbody fusion with titanium interbody cage,  local bone graft and allograft chips  3.  T4 to ilium thoracolumbar fusion with Solera instrumentation, local bone  graft, allograft chips, demineralized bone matrix, and bone marrow aspirate 4.  Cement augmentation T4-T5 with Kyphon cement    TOTAL KNEE ARTHROPLASTY  08/09/2017    Total Knee Arthroplasty       Family History   Problem Relation Name Age of Onset    Heart failure Mother      Liver disease Mother      Coronary artery disease Father      Heart attack Father      Colon cancer Brother      Heart attack Brother         Social History     Tobacco Use    Smoking status: Former     Current packs/day: 0.00      Types: Cigarettes     Quit date:      Years since quittin.1     Passive exposure: Past    Smokeless tobacco: Never   Vaping Use    Vaping status: Never Used   Substance Use Topics    Alcohol use: Yes     Comment: <1/week    Drug use: Never       Current Outpatient Medications on File Prior to Visit   Medication Sig Dispense Refill    acetaminophen (Tylenol) 500 mg tablet Take 2 tablets (1,000 mg) by mouth 2 times a day.      apixaban (Eliquis) 5 mg tablet Take 1 tablet (5 mg) by mouth 2 times a day. 180 tablet 3    atorvastatin (Lipitor) 80 mg tablet TAKE 1 TABLET ONE TIME DAILY 90 tablet 3    buPROPion XL (Wellbutrin XL) 150 mg 24 hr tablet Take 2 tablets (300 mg) by mouth once daily.      chlorthalidone (Hygroton) 25 mg tablet TAKE 1/2 TABLET ONE TIME DAILY 45 tablet 3    famotidine (Pepcid) 40 mg tablet Take 1 tablet (40 mg) by mouth once daily at bedtime. 90 tablet 1    flecainide (Tambocor) 100 mg tablet Take 3 tablets (300 mg) by mouth 1 time if needed.      gabapentin (Neurontin) 300 mg capsule TAKE 1 CAPSULE DAILY AT BEDTIME IF TOLERATING WELL MAY INCREASE TO 2 CAPSULE DAILY AT BEDTIME 60 capsule 5    levothyroxine (Synthroid, Levoxyl) 88 mcg tablet Take 1 tablet (88 mcg) by mouth early in the morning.. 90 tablet 3    metoprolol succinate XL (Toprol-XL) 50 mg 24 hr tablet TAKE 3 TABLETS ONE TIME DAILY 270 tablet 3    multivitamin-children's (Cerovite, Jr) chewable tablet Chew 1 tablet once daily.      omega-3 fatty acids-fish oil (Fish OiL) 340-1,000 mg capsule Take by mouth twice a day.      omeprazole (PriLOSEC) 40 mg DR capsule TAKE 1 CAPSULE TWICE DAILY BEFORE MEALS 180 capsule 3    sildenafil (Viagra) 50 mg tablet Take 1 tablet (50 mg) by mouth if needed for erectile dysfunction. 30 tablet 6    tamsulosin (Flomax) 0.4 mg 24 hr capsule TAKE 1 CAPSULE ONE TIME DAILY 90 capsule 3     No current facility-administered medications on file prior to visit.        Allergies   Allergen Reactions    Ace  Inhibitors Cough and Other     severe cough    Codeine Nausea Only     nausea    House Dust Cough    Pollen Extracts Cough    Sulfa (Sulfonamide Antibiotics) Nausea/vomiting          Imaging:  Narrative   Interpreted By:  ALLEN ARRINGTON MD  MRN: [id number]  Patient Name: SABINA SORTO     STUDY:  SPINE, ENTIRE THORACIC/LUMBAR, INCLUDE SKULL, CERVICAL ANSD SACRAL  SPINE WHEN PERFORMED 2 OR 3 VIEW;  2/21/2023 3:18 pm     INDICATION:  Acute lumbar radiculopathy  M54.50: Lower back pain.     COMPARISON:  12/06/2022     ACCESSION NUMBER(S):  [id number]     ORDERING CLINICIAN:  FAHAD ESCALANTE     FINDINGS:  Entire spine, multiple views     Posterior fusion extending from the upper thoracic spine to the iliac  bones, new from the prior. The hardware is intact. There is improved  alignment of the lumbar spine with mild scoliosis persisting. There  is severe spondylosis throughout the lumbar spine.      Impression   Postsurgical changes in the thoracolumbar spine with intact hardware.  Improved alignment     Physical Exam:  Gen.: Very pleasant, no acute distress  Eyes: Pupils symmetric  ENT: No hearing deficits noted  Neck: No JVD noted, tracheal position is midline  Respiratory: No SOB throughout exam  Cardiovascular: Extremity show some edema in the lower extremities bilaterally, nonpitting  Skin: No rashes or open lesions or ulcers identified on the skin  Musculoskeletal: Ambulates without assist device, large thoracolumbar incision which is well-healed.  Nontender to palpation, strength intact.  Negative Wolf's and Spurling's.  Neurologic: Cranial nerves II through XII are grossly intact  Psychiatric:  Patient is alert and oriented x3    Impression/Plan:  74-year-old male with a history of prior T4 to ilium fusion with multilevel decompression in the lumbar spine.  The patient has bothersome thoracic pain which can be as high as an 8 out of 10.  The patient has a difficult time being as active as he would like and  plain films have not shown any significant change outside of his surgical changes and hardware.  As he has completed 2 rounds of formal physical therapy and has tried oral medications, we discussed the potential for advanced imaging in the form of an MRI with metal suppression.  We will take a look at the thoracic area and see if there is any kind of structural cause to his pain.  We did discuss that many people can struggle with axial pain ongoing after a surgery like has had.  Pending imaging we may consider interventional pain procedures like epidural injection.  Potentially he could be candidate for neuromodulation we briefly discussed intrathecal therapy/stimulation.  To move forward we would need to see better what is happening in his spine.  Encouraged him to keep up with physical therapy and core strengthening.

## 2025-02-03 ENCOUNTER — HOSPITAL ENCOUNTER (OUTPATIENT)
Dept: RADIOLOGY | Facility: CLINIC | Age: 75
Discharge: HOME | End: 2025-02-03
Payer: MEDICARE

## 2025-02-03 DIAGNOSIS — M47.817 LUMBOSACRAL SPONDYLOSIS WITHOUT MYELOPATHY: ICD-10-CM

## 2025-02-03 PROCEDURE — 72146 MRI CHEST SPINE W/O DYE: CPT | Performed by: RADIOLOGY

## 2025-02-03 PROCEDURE — 72146 MRI CHEST SPINE W/O DYE: CPT

## 2025-02-03 NOTE — PROGRESS NOTES
[facility]  Hand and Upper Extremity Service  Follow up visit         Follow up for: Right thumb     Interval History: He returns for his right thumb. He was seen over a year ago for ulnar sided wrist pain and was given a ulnocarpal joint injection which he responded well to. He was given a right thumb CMC injection at last visit but he reports this didn't improve symptoms as well. He's taking gabapentin for chronic lower extremity neuropathic symptoms and taking Tylenol for overall pain in the wrist and hand. Activities that involve lifting, pinching, and twisting seem to aggravate symptoms more. He reports the pain at the base of the thumb as more constant but when he gets ulnar sided pain, it's more severe.               Past medical history, medications, allergies, surgical history and review of systems are reviewed and otherwise unchanged when compared to last visit on 11/26/24         Examination:  Constitutional: Oriented to person, place, and time.  Appears well-developed and well-nourished.  Head: Normocephalic and atraumatic.  Eyes: Pupils are equal, round, and reactive to light.  Cardiovascular: Intact distal pulses.  Pulmonary/Chest/Breast: Effort normal. No respiratory distress.  Neurological: Alert and oriented to person, place, and time.  Skin: Skin is warm and dry.  Psychiatric: normal mood and affect.  Behavior is normal.  Musculoskeletal: Right hand reveals mild degenerative changes. Bony prominence at first dorsal compartment of wrist. Minimal tenderness at radial styloid and first compartment. More significant pain with palpation and manipulation of thumb CMC joint. Minimal tenderness over ECU tendon. Negative ECU synergy test. No evidence of ECU instability. Positive TFCC grind test.       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 I've offered to give him repeat injections and wear his comfort cool brace more consistently throughout the day for CMC pain. He can wear the more rigid wrist brace at night if he feels appropriate.     Follow up: As needed      M Inj/Asp: R ulnocarpal on 2/4/2025 6:20 PM  Details: 25 G needle  Medications: 40 mg triamcinolone acetonide 40 mg/mL; 1 mL lidocaine 10 mg/mL (1 %)  Outcome: tolerated well, no immediate complications  Procedure, treatment alternatives, risks and benefits explained, specific risks discussed. Consent was given by the patient. Immediately prior to procedure a time out was called to verify the correct patient, procedure, equipment, support staff and site/side marked as required. Patient was prepped and draped in the usual sterile fashion.       S Inj/Asp: R thumb CMC on 2/4/2025 6:20 PM  Indications: pain  Details: 25 G needle, dorsal approach  Medications: 20 mg triamcinolone acetonide 40 mg/mL; 0.5 mL lidocaine 10 mg/mL (1 %)  Outcome: tolerated well, no immediate complications  Procedure, treatment alternatives, risks and benefits explained, specific risks discussed. Consent was given by the patient. Immediately prior to procedure a time out was called to verify the correct patient, procedure, equipment, support staff and site/side marked as required. Patient was prepped and draped in the usual sterile fashion.                 Jayden Palacios MD  [facility]  Department of Orthopaedic Surgery  Hand and Upper Extremity Reconstruction    Scribe Attestation  By signing my name below, I, Danya Gabriel , Scribsheila   attest that this documentation has been prepared under the direction and in the presence of Dr. Jayden Palacios.    Dictation performed with the use of voice recognition software.  Syntax and grammatical errors may exist.

## 2025-02-04 ENCOUNTER — OFFICE VISIT (OUTPATIENT)
Dept: ORTHOPEDIC SURGERY | Facility: HOSPITAL | Age: 75
End: 2025-02-04
Payer: MEDICARE

## 2025-02-04 VITALS — HEIGHT: 62 IN | WEIGHT: 199 LBS | BODY MASS INDEX: 36.62 KG/M2

## 2025-02-04 DIAGNOSIS — M24.131 DEGENERATIVE TFCC TEAR, RIGHT: ICD-10-CM

## 2025-02-04 DIAGNOSIS — M19.049 CMC ARTHRITIS: Primary | ICD-10-CM

## 2025-02-04 PROCEDURE — 1159F MED LIST DOCD IN RCRD: CPT | Performed by: ORTHOPAEDIC SURGERY

## 2025-02-04 PROCEDURE — 3008F BODY MASS INDEX DOCD: CPT | Performed by: ORTHOPAEDIC SURGERY

## 2025-02-04 PROCEDURE — 99213 OFFICE O/P EST LOW 20 MIN: CPT | Performed by: ORTHOPAEDIC SURGERY

## 2025-02-04 PROCEDURE — 1157F ADVNC CARE PLAN IN RCRD: CPT | Performed by: ORTHOPAEDIC SURGERY

## 2025-02-04 PROCEDURE — 20600 DRAIN/INJ JOINT/BURSA W/O US: CPT | Mod: RT | Performed by: ORTHOPAEDIC SURGERY

## 2025-02-04 PROCEDURE — 2500000004 HC RX 250 GENERAL PHARMACY W/ HCPCS (ALT 636 FOR OP/ED): Performed by: ORTHOPAEDIC SURGERY

## 2025-02-04 PROCEDURE — 20605 DRAIN/INJ JOINT/BURSA W/O US: CPT | Mod: RT | Performed by: ORTHOPAEDIC SURGERY

## 2025-02-04 PROCEDURE — 1036F TOBACCO NON-USER: CPT | Performed by: ORTHOPAEDIC SURGERY

## 2025-02-04 PROCEDURE — 99213 OFFICE O/P EST LOW 20 MIN: CPT | Mod: 25 | Performed by: ORTHOPAEDIC SURGERY

## 2025-02-04 RX ORDER — TRIAMCINOLONE ACETONIDE 40 MG/ML
20 INJECTION, SUSPENSION INTRA-ARTICULAR; INTRAMUSCULAR
Status: COMPLETED | OUTPATIENT
Start: 2025-02-04 | End: 2025-02-04

## 2025-02-04 RX ORDER — LIDOCAINE HYDROCHLORIDE 10 MG/ML
1 INJECTION, SOLUTION INFILTRATION; PERINEURAL
Status: COMPLETED | OUTPATIENT
Start: 2025-02-04 | End: 2025-02-04

## 2025-02-04 RX ORDER — TRIAMCINOLONE ACETONIDE 40 MG/ML
40 INJECTION, SUSPENSION INTRA-ARTICULAR; INTRAMUSCULAR
Status: COMPLETED | OUTPATIENT
Start: 2025-02-04 | End: 2025-02-04

## 2025-02-04 RX ORDER — LIDOCAINE HYDROCHLORIDE 10 MG/ML
0.5 INJECTION, SOLUTION INFILTRATION; PERINEURAL
Status: COMPLETED | OUTPATIENT
Start: 2025-02-04 | End: 2025-02-04

## 2025-02-04 RX ADMIN — LIDOCAINE HYDROCHLORIDE 1 ML: 10 INJECTION, SOLUTION INFILTRATION; PERINEURAL at 18:20

## 2025-02-04 RX ADMIN — TRIAMCINOLONE ACETONIDE 20 MG: 40 INJECTION, SUSPENSION INTRA-ARTICULAR; INTRAMUSCULAR at 18:20

## 2025-02-04 RX ADMIN — TRIAMCINOLONE ACETONIDE 40 MG: 40 INJECTION, SUSPENSION INTRA-ARTICULAR; INTRAMUSCULAR at 18:20

## 2025-02-04 RX ADMIN — LIDOCAINE HYDROCHLORIDE 0.5 ML: 10 INJECTION, SOLUTION INFILTRATION; PERINEURAL at 18:20

## 2025-02-04 ASSESSMENT — PAIN - FUNCTIONAL ASSESSMENT: PAIN_FUNCTIONAL_ASSESSMENT: 0-10

## 2025-02-04 ASSESSMENT — PAIN DESCRIPTION - DESCRIPTORS: DESCRIPTORS: ACHING;SORE

## 2025-02-04 ASSESSMENT — PAIN SCALES - GENERAL: PAINLEVEL_OUTOF10: 5 - MODERATE PAIN

## 2025-02-11 ENCOUNTER — OFFICE VISIT (OUTPATIENT)
Dept: CARDIOLOGY | Facility: HOSPITAL | Age: 75
End: 2025-02-11
Payer: MEDICARE

## 2025-02-11 VITALS
SYSTOLIC BLOOD PRESSURE: 110 MMHG | BODY MASS INDEX: 33.46 KG/M2 | WEIGHT: 196 LBS | DIASTOLIC BLOOD PRESSURE: 62 MMHG | HEIGHT: 64 IN | OXYGEN SATURATION: 98 % | HEART RATE: 72 BPM

## 2025-02-11 DIAGNOSIS — E78.00 HYPERCHOLESTEROLEMIA: ICD-10-CM

## 2025-02-11 DIAGNOSIS — I10 BENIGN ESSENTIAL HYPERTENSION: ICD-10-CM

## 2025-02-11 DIAGNOSIS — I25.10 CORONARY ARTERIOSCLEROSIS: Primary | ICD-10-CM

## 2025-02-11 DIAGNOSIS — I48.0 PAROXYSMAL ATRIAL FIBRILLATION (MULTI): ICD-10-CM

## 2025-02-11 PROCEDURE — 3078F DIAST BP <80 MM HG: CPT | Performed by: INTERNAL MEDICINE

## 2025-02-11 PROCEDURE — 99214 OFFICE O/P EST MOD 30 MIN: CPT | Performed by: INTERNAL MEDICINE

## 2025-02-11 PROCEDURE — 1159F MED LIST DOCD IN RCRD: CPT | Performed by: INTERNAL MEDICINE

## 2025-02-11 PROCEDURE — G2211 COMPLEX E/M VISIT ADD ON: HCPCS | Performed by: INTERNAL MEDICINE

## 2025-02-11 PROCEDURE — 1036F TOBACCO NON-USER: CPT | Performed by: INTERNAL MEDICINE

## 2025-02-11 PROCEDURE — 1157F ADVNC CARE PLAN IN RCRD: CPT | Performed by: INTERNAL MEDICINE

## 2025-02-11 PROCEDURE — 3008F BODY MASS INDEX DOCD: CPT | Performed by: INTERNAL MEDICINE

## 2025-02-11 PROCEDURE — 93005 ELECTROCARDIOGRAM TRACING: CPT | Performed by: INTERNAL MEDICINE

## 2025-02-11 PROCEDURE — 3074F SYST BP LT 130 MM HG: CPT | Performed by: INTERNAL MEDICINE

## 2025-02-11 PROCEDURE — 1160F RVW MEDS BY RX/DR IN RCRD: CPT | Performed by: INTERNAL MEDICINE

## 2025-02-11 NOTE — PROGRESS NOTES
Primary Care Physician: Pedro Tesfaye MD  Date of Visit: 02/11/2025  3:00 PM EST  Location of visit: [facility]     Chief Complaint:   Chief Complaint   Patient presents with    Follow-up        HPI / Summary:   Matt Huber is a 74 y.o. male who presents for followup.  He has no particular complaints.  He is able to walk up 1 flight of stairs without chest pain or shortness of breath.  He has mild dyspnea if he has to go up 2 flights of stairs.  This has not changed in many years.  The patient denies chest pain, palpitations, lightheadedness, syncope, orthopnea, paroxysmal nocturnal dyspnea, lower extremity edema, or bleeding problems.          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, of the knee 08/30/2023    Displaced fracture of coronoid process of left ulna, initial encounter for closed fracture 04/22/2015    Fracture of coronoid process of left ulna    Exotropia of right eye     Hypermetropia of both eyes     Hypertension     Lumbar stenosis with neurogenic claudication 10/21/2023    Medial epicondylitis, left elbow 04/13/2015    Medial epicondylitis of left elbow    Monocular diplopia     Obstructive sleep apnea 10/21/2023    Osteoarthritis of left hip 10/21/2023    Other chondrocalcinosis, right knee 08/05/2015    Chondrocalcinosis of knee, right    Other enthesopathies, not elsewhere classified 03/31/2014    Tendonitis of shoulder    Other enthesopathies, not elsewhere classified 05/22/2015    Tendonitis of elbow, left    Pain in left knee 01/10/2020    Left knee pain    Pain in unspecified hip 02/24/2016    Hip pain    Pain in unspecified shoulder 03/18/2014    Shoulder pain     Paroxysmal atrial fibrillation (Multi) 11/22/2022    Paroxysmal atrial fibrillation    Personal history of other diseases of the circulatory system 01/31/2014    History of mitral valve insufficiency    Personal history of other diseases of the musculoskeletal system and connective tissue 12/30/2014    History of trigger finger    Prediabetes 10/21/2023    Presbyopia of both eyes     Strabismus     Type 2 macular telangiectasis     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    Unilateral primary osteoarthritis, left knee 01/21/2019    Primary localized osteoarthrosis of left lower leg    Unspecified acute lower respiratory infection 06/10/2016    Acute lower respiratory tract infection    Unspecified injury of left wrist, hand and finger(s), initial encounter 05/22/2015    Left wrist injury    Unspecified injury of unspecified elbow, initial encounter 05/22/2015    Elbow injury        Past Surgical History:   Procedure Laterality Date    BACK SURGERY  02/04/2014    Back Surgery    CATARACT EXTRACTION W/  INTRAOCULAR LENS IMPLANT Left 09/13/2023    Dr Tsang    CATARACT EXTRACTION W/  INTRAOCULAR LENS IMPLANT Right 08/22/2023    Dr Tsang    KNEE SURGERY  02/04/2014    Knee Surgery Right    KNEE SURGERY  02/04/2014    Knee Surgery Left    MR HEAD ANGIO WO IV CONTRAST  01/06/2016    MR HEAD ANGIO WO IV CONTRAST 1/6/2016 [facility]    MR NECK ANGIO WO IV CONTRAST  01/06/2016    [place] NECK ANGIO WO IV CONTRAST [place] [facility] [place]    OTHER SURGICAL HISTORY  08/19/2020    Strabismus surgery    OTHER SURGICAL HISTORY  08/19/2020    Knee replacement    OTHER SURGICAL HISTORY  02/04/2014    Wrist Carpectomy    SPINE SURGERY  01/2023    1.  L1/L2-L5/S1 bilateral laminectomy facetectomy and foraminotomies  2.  L5-S1 transforaminal lumbar interbody fusion with titanium interbody cage,  local bone graft and allograft chips  3.  T4 to ilium thoracolumbar fusion with Solera instrumentation, local  bone  graft, allograft chips, demineralized bone matrix, and bone marrow aspirate 4.  Cement augmentation T4-T5 with Kyphon cement    TOTAL KNEE ARTHROPLASTY  08/09/2017    Total Knee Arthroplasty          Social History:   reports that he quit smoking about 49 years ago. His smoking use included cigarettes. He has been exposed to tobacco smoke. He has never used smokeless tobacco. He reports current alcohol use. He reports that he does not use drugs.     Family History:  family history includes Colon cancer in his brother; Coronary artery disease in his father; Heart attack in his brother and father; Heart failure in his mother; Liver disease in his mother.      Allergies:  Allergies   Allergen Reactions    Ace Inhibitors Cough and Other     severe cough    Codeine Nausea Only     nausea    House Dust Cough    Pollen Extracts Cough    Sulfa (Sulfonamide Antibiotics) Nausea/vomiting       Outpatient Medications:  Current Outpatient Medications   Medication Instructions    acetaminophen (TYLENOL) 1,000 mg, 2 times daily    apixaban (ELIQUIS) 5 mg, oral, 2 times daily    atorvastatin (LIPITOR) 80 mg, oral, Daily    buPROPion XL (Wellbutrin XL) 150 mg 24 hr tablet 2 tablets, Daily    chlorthalidone (Hygroton) 25 mg tablet TAKE 1/2 TABLET ONE TIME DAILY    famotidine (PEPCID) 40 mg, oral, Nightly    flecainide (TAMBOCOR) 300 mg, Once as needed    gabapentin (Neurontin) 300 mg capsule TAKE 1 CAPSULE DAILY AT BEDTIME IF TOLERATING WELL MAY INCREASE TO 2 CAPSULE DAILY AT BEDTIME    levothyroxine (SYNTHROID, LEVOXYL) 88 mcg, oral, Daily    metoprolol succinate XL (Toprol-XL) 50 mg 24 hr tablet TAKE 3 TABLETS ONE TIME DAILY    multivitamin-children's (Cerovite, Jr) chewable tablet 1 tablet, Daily    omeprazole (PriLOSEC) 40 mg DR capsule TAKE 1 CAPSULE TWICE DAILY BEFORE MEALS    sildenafil (VIAGRA) 50 mg, oral, As needed    tamsulosin (FLOMAX) 0.4 mg, oral, Daily       Physical Exam:  Vitals:    02/11/25 1509   BP: 110/62  "  BP Location: Right arm   Patient Position: Sitting   BP Cuff Size: Large adult   Pulse: 72   SpO2: 98%   Weight: 88.9 kg (196 lb)   Height: 1.626 m (5' 4\")     Wt Readings from Last 5 Encounters:   02/11/25 88.9 kg (196 lb)   02/04/25 90.3 kg (199 lb)   02/03/25 90.3 kg (199 lb 1.2 oz)   12/04/24 90.3 kg (199 lb)   11/26/24 89.8 kg (198 lb)     Body mass index is 33.64 kg/m².   General: Well-developed well-nourished in no acute distress  HEENT: Normocephalic atraumatic  Neck: Supple, JVP is normal negative hepatojugular reflux 2+ carotid pulses without bruit  Pulmonary: Normal respiratory effort, clear to auscultation  Cardiovascular: No right ventricular heave, normal S1 and S2, no murmurs rubs or gallops  Abdomen: Soft nontender nondistended  Extremities: Warm without edema 2+ radial pulses bilaterally   Neurologic: Alert and oriented x3  Psychiatric: Normal mood and affect     Last Labs:  CMP:  Recent Labs     10/08/24  1445 06/05/24  1325 08/30/23  1454    140 140   K 4.1 3.7 3.7    103 104   CO2 32 29 29   ANIONGAP 10 12 11   BUN 14 16 21   CREATININE 1.04 1.05 1.04   EGFR 75 75  --    GLUCOSE 88 116* 86     Recent Labs     10/08/24  1445 08/30/23  1454 02/18/23  1322   ALBUMIN 4.5 4.4 3.9   ALKPHOS 82 88 174*   ALT 39 18 10   AST 29 17 15   BILITOT 0.7 0.5 0.4     CBC:  Recent Labs     10/08/24  1445 06/05/24  1325 08/30/23  1454   WBC 6.7 6.5 8.9   HGB 14.1 12.6* 11.7*   HCT 42.1 37.5* 36.7*    211 231   MCV 97 96 97     COAG:   Recent Labs     01/16/23  1410 09/13/22  1337   INR 1.1 1.4*     HEME/ENDO:  Recent Labs     12/04/24  1444 10/08/24  1445 09/09/24  1449 08/30/23  1454 09/13/22  1150   TSH 4.45* 5.04*  --  2.35 3.93   HGBA1C  --   --  6.0  --  5.8*      CARDIAC: No results for input(s): \"LDH\", \"CKMB\", \"TROPHS\", \"BNP\" in the last 77124 hours.    No lab exists for component: \"CK\", \"CKMBP\"  Recent Labs     10/08/24  1445 08/30/23  1454 09/13/22  1150 09/02/21  0904   CHOL 150 109 " 126 119   LDLF  --  35 66 52   LDLCALC 51  --   --   --    HDL 45.5 44.2 39.5* 37.3*   TRIG 270* 147 104 149       Last Cardiology Tests:  ECG:  Electrocardiogram performed today that I reviewed shows sinus rhythm with PACs and PVCs.    Echo:  Echocardiogram September 12, 2022  CONCLUSIONS:   1. Left ventricular systolic function is normal with a 60-65% estimated ejection fraction.   2. The left atrium is severely dilated.   3. There is moderate mitral annular calcification.      Cath:  Cardiac catheterization May 2014      * Mild nonobstructive CAD in a right dominant system.      * Normal LVEDP.    Stress Test:  Stress Results:  No results found for this or any previous visit from the past 365 days.         Cardiac Imaging:  CT abdomen and pelvis February 2023  Aorta and vena cava are normal in size. Patchy atherosclerosis is present.     Cardiac MRI January 2017  IMPRESSION:  1. The left ventricular function and wall thickness is within normal  limits. There is no evidence of hypertrophic cardiomyopathy. No  abnormal delayed contrast enhancement is seen to suggest prior  ischemic damage or an infiltrative process..  2. Normal aortic, mitral, and tricuspid valve function.    Assessment/Plan   Diagnoses and all orders for this visit:  Coronary arteriosclerosis  Paroxysmal atrial fibrillation (Multi)  -     ECG 12 lead (Clinic Performed)  Hypercholesterolemia  Benign essential hypertension    In summary, Mr. Huber is a pleasant 74 year-old white male with past medical history significant for hypertension, mild left ventricular hypertrophy, hyperlipidemia, obstructive sleep apnea, family history of premature coronary artery disease, and paroxysmal atrial fibrillation and mild nonobstructive coronary artery disease on angiography.  He is relatively asymptomatic from a cardiac perspective.  He is euvolemic on exam.  His blood pressure is well-controlled.  His most recent lipid profile was not quite at goal.  I did  educate him with regards to the importance of diet and increase physical activity.  He is going to modify his diet and increase his exercise.  He should continue his current cardiovascular medications.  We will see him back in follow-up in 6 months.      Orders:  Orders Placed This Encounter   Procedures    ECG 12 lead (Clinic Performed)      Followup Appts:  Future Appointments   Date Time Provider Department Center   3/4/2025  1:45 PM Leobardo Romero MD [id number] [facility]   3/4/2025  2:20 PM Mikey Fitzpatrick MD [id number] [facility]   3/17/2025 11:50 AM Keira Bradley MD [id number] [facility]   5/13/2025  1:15 PM Flavio Stevens MD [id number] [facility]   6/17/2025 10:45 AM Amanda Tsang MD [id number] [facility]   9/8/2025  1:00 PM Alex Tesfaye MD [id number] [facility]           ____________________________________________________________  Rodriguez Perrin MD  [facility]  [facility]

## 2025-02-12 ENCOUNTER — APPOINTMENT (OUTPATIENT)
Dept: PAIN MEDICINE | Facility: HOSPITAL | Age: 75
End: 2025-02-12
Payer: MEDICARE

## 2025-02-12 LAB
ATRIAL RATE: 72 BPM
P AXIS: 94 DEGREES
P OFFSET: 156 MS
P ONSET: 124 MS
PR INTERVAL: 174 MS
Q ONSET: 211 MS
QRS COUNT: 12 BEATS
QRS DURATION: 84 MS
QT INTERVAL: 440 MS
QTC CALCULATION(BAZETT): 481 MS
QTC FREDERICIA: 467 MS
R AXIS: 12 DEGREES
T AXIS: 23 DEGREES
T OFFSET: 431 MS
VENTRICULAR RATE: 72 BPM

## 2025-02-19 DIAGNOSIS — I48.0 PAROXYSMAL ATRIAL FIBRILLATION (MULTI): Primary | ICD-10-CM

## 2025-02-19 DIAGNOSIS — I25.10 CORONARY ARTERIOSCLEROSIS: ICD-10-CM

## 2025-03-04 ENCOUNTER — APPOINTMENT (OUTPATIENT)
Dept: ORTHOPEDIC SURGERY | Facility: HOSPITAL | Age: 75
End: 2025-03-04
Payer: MEDICARE

## 2025-03-04 ENCOUNTER — OFFICE VISIT (OUTPATIENT)
Dept: ORTHOPEDIC SURGERY | Facility: CLINIC | Age: 75
End: 2025-03-04
Payer: MEDICARE

## 2025-03-04 VITALS — WEIGHT: 196 LBS | BODY MASS INDEX: 33.46 KG/M2 | HEIGHT: 64 IN

## 2025-03-04 DIAGNOSIS — M54.89 INTERSCAPULAR PAIN: ICD-10-CM

## 2025-03-04 PROCEDURE — 1159F MED LIST DOCD IN RCRD: CPT | Performed by: ORTHOPAEDIC SURGERY

## 2025-03-04 PROCEDURE — 99214 OFFICE O/P EST MOD 30 MIN: CPT | Performed by: ORTHOPAEDIC SURGERY

## 2025-03-04 PROCEDURE — 3008F BODY MASS INDEX DOCD: CPT | Performed by: ORTHOPAEDIC SURGERY

## 2025-03-04 PROCEDURE — 1157F ADVNC CARE PLAN IN RCRD: CPT | Performed by: ORTHOPAEDIC SURGERY

## 2025-03-05 NOTE — PROGRESS NOTES
HPI:Matt Huber is a 74-year-old man past medical history significant for T4 to ilium fusion.  He comes in today with some upper back pain.  He describes muscular pain in the intrascapular region.  No neurologic symptoms.  He has not had recent physical therapy.      ROS:  Reviewed on EMR and patient intake sheet.    PMH/SH:  Reviewed on EMR and patient intake sheet.    Exam:  Physical Exam    Constitutional: Well appearing; no acute distress  Eyes: pupils are equal and round  Psych: normal affect  Respiratory: non-labored breathing  Cardiovascular: regular rate and rhythm  GI: non-distended abdomen  Musculoskeletal: no pain with range of motion of the hips bilaterally  Neurologic: [5]/5 strength in the lower extremities bilaterally]; [negative] straight leg raise    Radiology:     Prior x-rays demonstrate stable instrumentation T4 to ilium.    Diagnosis:    Thoracic back pain    Assessment and Plan:   74-year-old man with muscular thoracic back pain in the intrascapular region.  I reassured the patient this is very common for this procedure.  I have recommended physical therapy with intrascapular muscle strengthening, and dry needle treatment.  I can see him back as needed.  No further management required at this time.    The patient was in agreement with the plan. At the end of the visit today, the patient felt that all questions had been answered satisfactorily.  The patient was pleased with the visit and very appreciative for the care rendered.     Thank you very much for the kind referral.  It is a privilege, and a pleasure, to partner with you in the care of your patients.  I would be delighted to assist you with any further consultations as needed.          Mikey Fitzpatrick MD    Chief of Spine Surgery, [facility]  Director of Spine Service, [facility]  , Department of Orthopaedics  [employer] [employer]  [facility]  [address] [place] [address]  P: [phone]  [facility].[employer] [facility]    This note was dictated with voice recognition software.  It has not been proofread for grammatical errors, typographical mistakes or other semantic inconsistencies.

## 2025-03-10 DIAGNOSIS — K22.70 BARRETT'S ESOPHAGUS WITHOUT DYSPLASIA: ICD-10-CM

## 2025-03-12 RX ORDER — FAMOTIDINE 40 MG/1
40 TABLET, FILM COATED ORAL NIGHTLY
Qty: 90 TABLET | Refills: 3 | Status: SHIPPED | OUTPATIENT
Start: 2025-03-12 | End: 2026-03-12

## 2025-03-17 ENCOUNTER — APPOINTMENT (OUTPATIENT)
Dept: UROLOGY | Facility: CLINIC | Age: 75
End: 2025-03-17
Payer: MEDICARE

## 2025-03-17 VITALS — TEMPERATURE: 96.5 F

## 2025-03-17 DIAGNOSIS — N40.1 BPH WITH OBSTRUCTION/LOWER URINARY TRACT SYMPTOMS: Primary | ICD-10-CM

## 2025-03-17 DIAGNOSIS — N13.8 BPH WITH OBSTRUCTION/LOWER URINARY TRACT SYMPTOMS: Primary | ICD-10-CM

## 2025-03-17 DIAGNOSIS — N52.9 MALE ERECTILE DISORDER: ICD-10-CM

## 2025-03-17 LAB
POC APPEARANCE, URINE: CLEAR
POC BILIRUBIN, URINE: NEGATIVE
POC BLOOD, URINE: NEGATIVE
POC COLOR, URINE: YELLOW
POC GLUCOSE, URINE: NEGATIVE MG/DL
POC KETONES, URINE: NEGATIVE MG/DL
POC LEUKOCYTES, URINE: NEGATIVE
POC NITRITE,URINE: NEGATIVE
POC PH, URINE: 7 PH
POC PROTEIN, URINE: ABNORMAL MG/DL
POC SPECIFIC GRAVITY, URINE: 1.01
POC UROBILINOGEN, URINE: 1 EU/DL

## 2025-03-17 PROCEDURE — 99214 OFFICE O/P EST MOD 30 MIN: CPT | Performed by: UROLOGY

## 2025-03-17 PROCEDURE — 1036F TOBACCO NON-USER: CPT | Performed by: UROLOGY

## 2025-03-17 PROCEDURE — 1157F ADVNC CARE PLAN IN RCRD: CPT | Performed by: UROLOGY

## 2025-03-17 PROCEDURE — 51741 ELECTRO-UROFLOWMETRY FIRST: CPT | Performed by: UROLOGY

## 2025-03-17 PROCEDURE — 81003 URINALYSIS AUTO W/O SCOPE: CPT | Performed by: UROLOGY

## 2025-03-17 PROCEDURE — 1126F AMNT PAIN NOTED NONE PRSNT: CPT | Performed by: UROLOGY

## 2025-03-17 PROCEDURE — 1159F MED LIST DOCD IN RCRD: CPT | Performed by: UROLOGY

## 2025-03-17 PROCEDURE — G2211 COMPLEX E/M VISIT ADD ON: HCPCS | Performed by: UROLOGY

## 2025-03-17 PROCEDURE — 51798 US URINE CAPACITY MEASURE: CPT | Performed by: UROLOGY

## 2025-03-17 RX ORDER — TAMSULOSIN HYDROCHLORIDE 0.4 MG/1
0.4 CAPSULE ORAL DAILY
Qty: 90 CAPSULE | Refills: 3 | Status: SHIPPED | OUTPATIENT
Start: 2025-03-17

## 2025-03-17 RX ORDER — SILDENAFIL 50 MG/1
50 TABLET, FILM COATED ORAL AS NEEDED
Qty: 30 TABLET | Refills: 6 | Status: SHIPPED | OUTPATIENT
Start: 2025-03-17 | End: 2026-03-17

## 2025-03-17 ASSESSMENT — PAIN SCALES - GENERAL: PAINLEVEL_OUTOF10: 0-NO PAIN

## 2025-03-17 NOTE — PROGRESS NOTES
Subjective   Matt Huber is a 74 y.o. male with a history of BPH with LUTS managed on Tamsulosin and ED on Sildenafil, presenting today for a follow up visit. Patient reports his urinary symptoms are stable. He has nocturia x2 with occasional urinary urgency; which are not bothersome. Denies any recent gross hematuria, fevers, chills, urinary retention, intractable flank or abdominal pain, nausea or vomiting.            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, of the knee 08/30/2023    Displaced fracture of coronoid process of left ulna, initial encounter for closed fracture 04/22/2015    Fracture of coronoid process of left ulna    Exotropia of right eye     Hypermetropia of both eyes     Hypertension     Lumbar stenosis with neurogenic claudication 10/21/2023    Medial epicondylitis, left elbow 04/13/2015    Medial epicondylitis of left elbow    Monocular diplopia     Obstructive sleep apnea 10/21/2023    Osteoarthritis of left hip 10/21/2023    Other chondrocalcinosis, right knee 08/05/2015    Chondrocalcinosis of knee, right    Other enthesopathies, not elsewhere classified 03/31/2014    Tendonitis of shoulder    Other enthesopathies, not elsewhere classified 05/22/2015    Tendonitis of elbow, left    Pain in left knee 01/10/2020    Left knee pain    Pain in unspecified hip 02/24/2016    Hip pain    Pain in unspecified shoulder 03/18/2014    Shoulder pain    Paroxysmal atrial fibrillation (Multi) 11/22/2022    Paroxysmal atrial fibrillation    Personal history of other diseases of the circulatory system 01/31/2014    History of mitral valve insufficiency    Personal history of other  diseases of the musculoskeletal system and connective tissue 12/30/2014    History of trigger finger    Prediabetes 10/21/2023    Presbyopia of both eyes     Strabismus     Type 2 macular telangiectasis     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    Unilateral primary osteoarthritis, left knee 01/21/2019    Primary localized osteoarthrosis of left lower leg    Unspecified acute lower respiratory infection 06/10/2016    Acute lower respiratory tract infection    Unspecified injury of left wrist, hand and finger(s), initial encounter 05/22/2015    Left wrist injury    Unspecified injury of unspecified elbow, initial encounter 05/22/2015    Elbow injury     Past Surgical History:   Procedure Laterality Date    BACK SURGERY  02/04/2014    Back Surgery    CATARACT EXTRACTION W/  INTRAOCULAR LENS IMPLANT Left 09/13/2023    Dr Tsang    CATARACT EXTRACTION W/  INTRAOCULAR LENS IMPLANT Right 08/22/2023    Dr Tsang    KNEE SURGERY  02/04/2014    Knee Surgery Right    KNEE SURGERY  02/04/2014    Knee Surgery Left    MR HEAD ANGIO WO IV CONTRAST  01/06/2016    MR HEAD ANGIO WO IV CONTRAST 1/6/2016 [facility]    MR NECK ANGIO WO IV CONTRAST  01/06/2016    MR NECK ANGIO WO IV CONTRAST 1/6/2016 [facility]    OTHER SURGICAL HISTORY  08/19/2020    Strabismus surgery    OTHER SURGICAL HISTORY  08/19/2020    Knee replacement    OTHER SURGICAL HISTORY  02/04/2014    Wrist Carpectomy    SPINE SURGERY  01/2023    1.  L1/L2-L5/S1 bilateral laminectomy facetectomy and foraminotomies  2.  L5-S1 transforaminal lumbar interbody fusion with titanium interbody cage,  local bone graft and allograft chips  3.  T4 to ilium thoracolumbar fusion with Solera instrumentation, local bone  graft, allograft chips, demineralized bone matrix, and bone marrow aspirate 4.  Cement augmentation T4-T5 with Kyphon cement    TOTAL KNEE ARTHROPLASTY  08/09/2017    Total Knee Arthroplasty     Family History   Problem Relation  Name Age of Onset    Heart failure Mother      Liver disease Mother      Coronary artery disease Father      Heart attack Father      Colon cancer Brother      Heart attack Brother       Current Outpatient Medications   Medication Sig Dispense Refill    acetaminophen (Tylenol) 500 mg tablet Take 2 tablets (1,000 mg) by mouth 2 times a day.      apixaban (Eliquis) 5 mg tablet Take 1 tablet (5 mg) by mouth 2 times a day. 180 tablet 3    atorvastatin (Lipitor) 80 mg tablet TAKE 1 TABLET ONE TIME DAILY 90 tablet 3    buPROPion XL (Wellbutrin XL) 150 mg 24 hr tablet Take 2 tablets (300 mg) by mouth once daily.      chlorthalidone (Hygroton) 25 mg tablet TAKE 1/2 TABLET ONE TIME DAILY 45 tablet 3    famotidine (Pepcid) 40 mg tablet Take 1 tablet (40 mg) by mouth once daily at bedtime. 90 tablet 3    flecainide (Tambocor) 100 mg tablet Take 3 tablets (300 mg) by mouth 1 time if needed.      gabapentin (Neurontin) 300 mg capsule TAKE 1 CAPSULE DAILY AT BEDTIME IF TOLERATING WELL MAY INCREASE TO 2 CAPSULE DAILY AT BEDTIME 60 capsule 5    levothyroxine (Synthroid, Levoxyl) 88 mcg tablet Take 1 tablet (88 mcg) by mouth early in the morning.. 90 tablet 3    metoprolol succinate XL (Toprol-XL) 50 mg 24 hr tablet TAKE 3 TABLETS ONE TIME DAILY 270 tablet 3    multivitamin-children's (Cerovite, Jr) chewable tablet Chew 1 tablet once daily.      omeprazole (PriLOSEC) 40 mg DR capsule TAKE 1 CAPSULE TWICE DAILY BEFORE MEALS 180 capsule 3    sildenafil (Viagra) 50 mg tablet Take 1 tablet (50 mg) by mouth if needed for erectile dysfunction. 30 tablet 6    tamsulosin (Flomax) 0.4 mg 24 hr capsule TAKE 1 CAPSULE ONE TIME DAILY 90 capsule 3     No current facility-administered medications for this visit.     Allergies   Allergen Reactions    Ace Inhibitors Cough and Other     severe cough    Codeine Nausea Only     nausea    House Dust Cough    Pollen Extracts Cough    Sulfa (Sulfonamide Antibiotics) Nausea/vomiting     Social History      Socioeconomic History    Marital status:      Spouse name: Not on file    Number of children: Not on file    Years of education: Not on file    Highest education level: Not on file   Occupational History    Not on file   Tobacco Use    Smoking status: Former     Current packs/day: 0.00     Types: Cigarettes     Quit date:      Years since quittin.2     Passive exposure: Past    Smokeless tobacco: Never   Vaping Use    Vaping status: Never Used   Substance and Sexual Activity    Alcohol use: Yes     Comment: <1/week    Drug use: Never    Sexual activity: Not on file   Other Topics Concern    Not on file   Social History Narrative    Not on file     Social Drivers of Health     Financial Resource Strain: Not on file   Food Insecurity: Not on file   Transportation Needs: Not on file   Physical Activity: Not on file   Stress: Not on file   Social Connections: Not on file   Intimate Partner Violence: Not on file   Housing Stability: Not on file       Review of Systems  Pertinent items are noted in HPI.    Objective       Lab Review  Lab Results   Component Value Date    WBC 6.7 10/08/2024    RBC 4.33 (L) 10/08/2024    HGB 14.1 10/08/2024    HCT 42.1 10/08/2024     10/08/2024      Lab Results   Component Value Date    BUN 14 10/08/2024    CREATININE 1.04 10/08/2024      Lab Results   Component Value Date    PSA 1.16 10/22/2018   IPSS 14 and 3  PVR 14 ml  Uroflow study demonstrated voided volume of 151 and maximal flow rate of 10.8 ml/s.     Urine analysis shows +protein         Assessment/Plan   Diagnoses and all orders for this visit:  BPH with obstruction/lower urinary tract symptoms  -     Measure post void residual  -     POCT UA Automated manually resulted      BPH with LUTS     Patient's urinary symptoms are stable, will continue to monitor.     Refill Tamsulosin 0.4 mg and follow up in 1 year.     ED  - good response to Sildenafil, will refill.       All questions were answered to the  patient's satisfaction. Patient agrees with the plan and wishes to proceed. Follow-up will be scheduled appropriately.     E&M visit today is associated with current or anticipated ongoing medical care services related to a patient's single, serious condition or a complex condition.    Scribed for Dr. Bradley by Nancy Sinclair. I , Dr Bradley, have personally reviewed and agreed with the information entered by the Virtual Scribe.

## 2025-03-29 NOTE — PROGRESS NOTES
Subjective    Patient ID: Matt Huber is a 74 y.o. male.    Chief Complaint: No chief complaint on file.     Last Surgery: No surgery found  Last Surgery Date: No surgery found    DOROTA Gutierrez is a 74 y.o. right hand dominant male presenting today for evaluation of their left shoulder.     3 years ago he believes he tore his bicep and he had bruising down to his elbow  It did not hurt so he left it alone. 1.5 years ago he had spine surgery with Dr. Fitzpatrick  As he was recovering from this, his shoulder began to start bothering him. His shoulder is to the point where it hurts after he has been looking at his phone for 5 minutes. He struggles with sleep at night.     08/20/24  Matt Huber is a 74 y.o. male returning to the clinic for a follow up visit regarding his right shoulder.    He explains that while his last visit was regarding his left shoulder, his right shoulder is now the problematic shoulder. The injection 3 months ago in the left side helped well. He has been doing aquatherapy and this has helped both of his shoulders immensely.     He does note that he fell out of bed 2 weeks ago. This is his second fall since December 2023. His shoulders were hurting prior to this.     12/05/24  Matt Huber is a 74 y.o. male returning to the clinic for a follow up visit regarding his right shoulder.    He reports that his right shoulder has become quite painful since his last visit. He did have a prescription for therapy but was unable to start it as he has been busy. His left shoulder is somewhat painful today    4/1/25  Matt Huber is a 74 y.o. male returning to the clinic for a follow up visit regarding his bilateral shoulders.    He reports that he is doing not too bad at this time. He began physical therapy for his back and has been working on his shoulders as well. His previous injection worked well and has not quite worn off at this time.     Past medical history, surgical history, social history, and  family history were all reviewed and are as per the [facility] health history questionnaire form that I signed and scanned into the chart today.     Objective   Ortho Exam  Patient is a well-developed, well-nourished male in no acute distress.  Breathes with normal chest rises.  Pupils are round and symmetric today.  Awake, alert, and oriented x3.      Examination of the right shoulder today reveals the skin to be intact. There is no sign of any atrophy, lesions, or abrasions. There is no pain to palpation of the bony prominences. Cervical lymphadenopathy examined, and this was negative. Patient had 5 out of 5 wrist flexion, extension, and thumb extension, bilaterally. Sensation was intact to light touch to median, ulnar, radial, axillary, and musculocutaneous nerves bilaterally. Positive radial pulse bilaterally. Provocative maneuvers on the right side today were negative.  Range of motion of the right shoulder revealed 0-130° of forward elevation, 0-30° of external rotation passively, and internal rotation up to L-3. 5/5 RER.     Examination of the left shoulder today reveals the skin to be intact. There is no sign of any atrophy, lesions, or abrasions. Tender along the periscapular muscles. Cervical lymphadenopathy examined, and this was negative. Patient had 5 out of 5 wrist flexion, extension, and thumb extension bilaterally. Sensation was intact to light touch to median, ulnar, radial axillary, and musculocutaneous nerves bilaterally. Positive radial pulse bilaterally. Mildly positive Hawkin's ign. Range of motion of the left shoulder revealed 0-160° of forward elevation, 0-50° of external rotation passively, and internal rotation was to T-12. 5/5 Rosemary's testing with pain. Wyatt deformity     Image Results:  X-rays of the left shoulder taken 05/2025 personally ordered and interpreted by me show greater tuberosity changes. Calcifications in the rotator cuff.           Assessment/Plan   Encounter  Diagnoses:  Chronic right shoulder pain  Patient with bilateral shoulder pain likely related to the rotator cuff, and partial left bicep rupture    At this time, we had a very long discussion with the patient regarding the diagnosis. Rotator cuff disease is a spectrum of disorders ranging from rotator cuff tendinitis to full-thickness rotator cuff tears. We know that some patients over the age of 50 will have symptomatic rotator cuff tears just due to overuse and general wear and tear. In general, most patients who come in with complaints such as these will get better with therapy and injections alone. The therapy should be performed 2-3 times a day and should take about 10-15 minutes to perform each time.      We did not do an injection today as his previous is still providing him with relief. We will make him an appointment to see us back in 2 months. In the meantime he will continue his at-home exercises and physical therapy.     Orders Placed This Encounter    XR shoulder right 2+ views     Follow up in 2 months     Scribe Attestation  By signing my name below, IAmy Scribe   attest that this documentation has been prepared under the direction and in the presence of Leobardo Romero MD.

## 2025-04-01 ENCOUNTER — OFFICE VISIT (OUTPATIENT)
Dept: ORTHOPEDIC SURGERY | Facility: HOSPITAL | Age: 75
End: 2025-04-01
Payer: MEDICARE

## 2025-04-01 ENCOUNTER — HOSPITAL ENCOUNTER (OUTPATIENT)
Dept: RADIOLOGY | Facility: HOSPITAL | Age: 75
Discharge: HOME | End: 2025-04-01
Payer: MEDICARE

## 2025-04-01 DIAGNOSIS — G89.29 CHRONIC RIGHT SHOULDER PAIN: Primary | ICD-10-CM

## 2025-04-01 DIAGNOSIS — M25.511 CHRONIC RIGHT SHOULDER PAIN: ICD-10-CM

## 2025-04-01 DIAGNOSIS — M25.511 CHRONIC RIGHT SHOULDER PAIN: Primary | ICD-10-CM

## 2025-04-01 DIAGNOSIS — G89.29 CHRONIC RIGHT SHOULDER PAIN: ICD-10-CM

## 2025-04-01 PROCEDURE — 1157F ADVNC CARE PLAN IN RCRD: CPT | Performed by: ORTHOPAEDIC SURGERY

## 2025-04-01 PROCEDURE — 99213 OFFICE O/P EST LOW 20 MIN: CPT | Mod: 25 | Performed by: ORTHOPAEDIC SURGERY

## 2025-04-01 PROCEDURE — 73030 X-RAY EXAM OF SHOULDER: CPT | Mod: RIGHT SIDE | Performed by: RADIOLOGY

## 2025-04-01 PROCEDURE — 99213 OFFICE O/P EST LOW 20 MIN: CPT | Performed by: ORTHOPAEDIC SURGERY

## 2025-04-01 PROCEDURE — 73030 X-RAY EXAM OF SHOULDER: CPT | Mod: RT

## 2025-04-17 ENCOUNTER — APPOINTMENT (OUTPATIENT)
Dept: OPHTHALMOLOGY | Facility: CLINIC | Age: 75
End: 2025-04-17
Payer: MEDICARE

## 2025-04-24 ENCOUNTER — APPOINTMENT (OUTPATIENT)
Dept: OPHTHALMOLOGY | Facility: CLINIC | Age: 75
End: 2025-04-24
Payer: MEDICARE

## 2025-05-13 ENCOUNTER — APPOINTMENT (OUTPATIENT)
Dept: OPHTHALMOLOGY | Facility: CLINIC | Age: 75
End: 2025-05-13
Payer: MEDICARE

## 2025-06-03 ENCOUNTER — APPOINTMENT (OUTPATIENT)
Dept: ORTHOPEDIC SURGERY | Facility: HOSPITAL | Age: 75
End: 2025-06-03
Payer: MEDICARE

## 2025-06-17 ENCOUNTER — APPOINTMENT (OUTPATIENT)
Dept: OPHTHALMOLOGY | Facility: CLINIC | Age: 75
End: 2025-06-17
Payer: MEDICARE

## 2025-07-22 ENCOUNTER — HOSPITAL ENCOUNTER (OUTPATIENT)
Dept: RADIOLOGY | Facility: HOSPITAL | Age: 75
Discharge: HOME | End: 2025-07-22
Payer: MEDICARE

## 2025-07-22 ENCOUNTER — OFFICE VISIT (OUTPATIENT)
Dept: ORTHOPEDIC SURGERY | Facility: HOSPITAL | Age: 75
End: 2025-07-22
Payer: MEDICARE

## 2025-07-22 VITALS — BODY MASS INDEX: 33.29 KG/M2 | WEIGHT: 195 LBS | HEIGHT: 64 IN

## 2025-07-22 DIAGNOSIS — M25.512 LEFT SHOULDER PAIN, UNSPECIFIED CHRONICITY: ICD-10-CM

## 2025-07-22 DIAGNOSIS — M25.511 RIGHT SHOULDER PAIN: ICD-10-CM

## 2025-07-22 DIAGNOSIS — M25.511 CHRONIC RIGHT SHOULDER PAIN: ICD-10-CM

## 2025-07-22 DIAGNOSIS — M25.512 LEFT SHOULDER PAIN, UNSPECIFIED CHRONICITY: Primary | ICD-10-CM

## 2025-07-22 DIAGNOSIS — G89.29 CHRONIC RIGHT SHOULDER PAIN: ICD-10-CM

## 2025-07-22 PROCEDURE — 73030 X-RAY EXAM OF SHOULDER: CPT | Mod: RIGHT SIDE | Performed by: RADIOLOGY

## 2025-07-22 PROCEDURE — 1159F MED LIST DOCD IN RCRD: CPT | Performed by: ORTHOPAEDIC SURGERY

## 2025-07-22 PROCEDURE — 99213 OFFICE O/P EST LOW 20 MIN: CPT | Performed by: ORTHOPAEDIC SURGERY

## 2025-07-22 PROCEDURE — 99211 OFF/OP EST MAY X REQ PHY/QHP: CPT | Mod: 25 | Performed by: ORTHOPAEDIC SURGERY

## 2025-07-22 PROCEDURE — 20610 DRAIN/INJ JOINT/BURSA W/O US: CPT | Mod: RT | Performed by: ORTHOPAEDIC SURGERY

## 2025-07-22 PROCEDURE — 2500000004 HC RX 250 GENERAL PHARMACY W/ HCPCS (ALT 636 FOR OP/ED): Performed by: ORTHOPAEDIC SURGERY

## 2025-07-22 PROCEDURE — 3008F BODY MASS INDEX DOCD: CPT | Performed by: ORTHOPAEDIC SURGERY

## 2025-07-22 PROCEDURE — 73030 X-RAY EXAM OF SHOULDER: CPT | Mod: RT

## 2025-07-22 PROCEDURE — 1125F AMNT PAIN NOTED PAIN PRSNT: CPT | Performed by: ORTHOPAEDIC SURGERY

## 2025-07-22 RX ADMIN — TRIAMCINOLONE ACETONIDE 40 MG: 400 INJECTION, SUSPENSION INTRA-ARTICULAR; INTRAMUSCULAR at 15:47

## 2025-07-22 RX ADMIN — LIDOCAINE HYDROCHLORIDE 4 ML: 10 INJECTION, SOLUTION INFILTRATION; PERINEURAL at 15:47

## 2025-07-22 ASSESSMENT — PAIN - FUNCTIONAL ASSESSMENT: PAIN_FUNCTIONAL_ASSESSMENT: 0-10

## 2025-07-22 ASSESSMENT — PAIN SCALES - GENERAL: PAINLEVEL_OUTOF10: 6

## 2025-07-22 NOTE — PROGRESS NOTES
Subjective    Patient ID: Matt Huber is a 74 y.o. male.    Chief Complaint: No chief complaint on file.     Last Surgery: No surgery found  Last Surgery Date: No surgery found    DOROTA Gutierrez is a 74 y.o. right hand dominant male presenting today for evaluation of their left shoulder.     3 years ago he believes he tore his bicep and he had bruising down to his elbow  It did not hurt so he left it alone. 1.5 years ago he had spine surgery with Dr. Fitzpatrick  As he was recovering from this, his shoulder began to start bothering him. His shoulder is to the point where it hurts after he has been looking at his phone for 5 minutes. He struggles with sleep at night.     08/20/24  Matt Huber is a 74 y.o. male returning to the clinic for a follow up visit regarding his right shoulder.    He explains that while his last visit was regarding his left shoulder, his right shoulder is now the problematic shoulder. The injection 3 months ago in the left side helped well. He has been doing aquatherapy and this has helped both of his shoulders immensely.     He does note that he fell out of bed 2 weeks ago. This is his second fall since December 2023. His shoulders were hurting prior to this.     12/05/24  Matt Huber is a 74 y.o. male returning to the clinic for a follow up visit regarding his right shoulder.    He reports that his right shoulder has become quite painful since his last visit. He did have a prescription for therapy but was unable to start it as he has been busy. His left shoulder is somewhat painful today    04/01/25  Matt Huber is a 74 y.o. male returning to the clinic for a follow up visit regarding his bilateral shoulders.    He reports that he is doing not too bad at this time. He began physical therapy for his back and has been working on his shoulders as well. His previous injection worked well and has not quite worn off at this time.     7/22/25  Matt Huber is a 74 y.o. male returning to  the clinic for a follow up visit regarding his bilateral shoulders.    He explains that he is ready for repeat injections today after canceling his previous appointment.     Past medical history, surgical history, social history, and family history were all reviewed and are as per the [facility] patient health history questionnaire form that I signed and scanned into the chart today.     Objective   Ortho Exam  Patient is a well-developed, well-nourished male in no acute distress.  Breathes with normal chest rises.  Pupils are round and symmetric today.  Awake, alert, and oriented x3.      Examination of the right shoulder today reveals the skin to be intact. There is no sign of any atrophy, lesions, or abrasions. Cervical lymphadenopathy examined, and this was negative. Patient had 5 out of 5 wrist flexion, extension, and thumb extension, bilaterally. Sensation was intact to light touch to median, ulnar, radial, axillary, and musculocutaneous nerves bilaterally. Positive radial pulse bilaterally. Provocative maneuvers on the right side today were negative.  Range of motion of the right shoulder revealed 0-130° of forward elevation, 0-30° of external rotation passively, and internal rotation up to L-3. 5/5 RER. +Hawkin's. +Neer's. Pain along the acromio and greater tuberosity.     Examination of the left shoulder today reveals the skin to be intact. There is no sign of any atrophy, lesions, or abrasions. Tender along the periscapular muscles. Cervical lymphadenopathy examined, and this was negative. Patient had 5 out of 5 wrist flexion, extension, and thumb extension bilaterally. Sensation was intact to light touch to median, ulnar, radial axillary, and musculocutaneous nerves bilaterally. Positive radial pulse bilaterally. Mildly positive Hawkin's ign. Range of motion of the left shoulder revealed 0-160° of forward elevation, 0-50° of external rotation passively, and internal rotation was to T-12. 5/5 Rosemary's testing with  pain. Wyatt deformity     Image Results:  X-rays of the left shoulder taken 05/2025 personally ordered and interpreted by me show greater tuberosity changes. Calcifications in the rotator cuff.       L Inj/Asp: R subacromial bursa on 7/22/2025 3:47 PM  Indications: pain  Details: 20 G needle, anterior approach  Medications: 40 mg triamcinolone acetonide 40 mg/mL; 4 mL lidocaine 10 mg/mL (1 %)  Outcome: tolerated well, no immediate complications  Procedure, treatment alternatives, risks and benefits explained, specific risks discussed. Consent was given by the patient. Immediately prior to procedure a time out was called to verify the correct patient, procedure, equipment, support staff and site/side marked as required. Patient was prepped and draped in the usual sterile fashion.             Assessment/Plan   Encounter Diagnoses:  Left shoulder pain, unspecified chronicity  Patient with bilateral shoulder pain likely related to the rotator cuff, and partial left bicep rupture    At this time, we had a very long discussion with the patient regarding the diagnosis. Rotator cuff disease is a spectrum of disorders ranging from rotator cuff tendinitis to full-thickness rotator cuff tears. We know that some patients over the age of 50 will have symptomatic rotator cuff tears just due to overuse and general wear and tear. In general, most patients who come in with complaints such as these will get better with therapy and injections alone. The therapy should be performed 2-3 times a day and should take about 10-15 minutes to perform each time.      He tolerated the injection well today in the RIGHT shoulder. We will see how they do after the injection. They are aware that there can be a spike in glucose levels following an injection.    If they do not get sustained relief from the injection, consideration for a repeat injection versus advanced imaging of an MRI will be warranted as then they would have failed over 6 weeks  of conservative management of injections, therapy, rest and over-the-counter pain medications.      Orders Placed This Encounter    XR shoulder left 2+ views     Follow up in 2 months     Scribe Attestation  By signing my name below, I, Isaias Nickerson   attest that this documentation has been prepared under the direction and in the presence of Leobardo Romero MD.

## 2025-07-23 ENCOUNTER — HOSPITAL ENCOUNTER (OUTPATIENT)
Dept: RADIOLOGY | Facility: CLINIC | Age: 75
Discharge: HOME | End: 2025-07-23
Payer: MEDICARE

## 2025-07-23 ENCOUNTER — APPOINTMENT (OUTPATIENT)
Dept: PRIMARY CARE | Facility: CLINIC | Age: 75
End: 2025-07-23
Payer: MEDICARE

## 2025-07-23 VITALS
OXYGEN SATURATION: 96 % | WEIGHT: 197 LBS | DIASTOLIC BLOOD PRESSURE: 76 MMHG | TEMPERATURE: 97.2 F | SYSTOLIC BLOOD PRESSURE: 138 MMHG | HEIGHT: 64 IN | HEART RATE: 54 BPM | BODY MASS INDEX: 33.63 KG/M2

## 2025-07-23 DIAGNOSIS — G95.9 CERVICAL MYELOPATHY: ICD-10-CM

## 2025-07-23 DIAGNOSIS — R06.09 EXERTIONAL DYSPNEA: Primary | ICD-10-CM

## 2025-07-23 DIAGNOSIS — R06.09 EXERTIONAL DYSPNEA: ICD-10-CM

## 2025-07-23 PROCEDURE — 3075F SYST BP GE 130 - 139MM HG: CPT | Performed by: STUDENT IN AN ORGANIZED HEALTH CARE EDUCATION/TRAINING PROGRAM

## 2025-07-23 PROCEDURE — 99214 OFFICE O/P EST MOD 30 MIN: CPT | Performed by: STUDENT IN AN ORGANIZED HEALTH CARE EDUCATION/TRAINING PROGRAM

## 2025-07-23 PROCEDURE — 3008F BODY MASS INDEX DOCD: CPT | Performed by: STUDENT IN AN ORGANIZED HEALTH CARE EDUCATION/TRAINING PROGRAM

## 2025-07-23 PROCEDURE — 1126F AMNT PAIN NOTED NONE PRSNT: CPT | Performed by: STUDENT IN AN ORGANIZED HEALTH CARE EDUCATION/TRAINING PROGRAM

## 2025-07-23 PROCEDURE — 71046 X-RAY EXAM CHEST 2 VIEWS: CPT | Performed by: RADIOLOGY

## 2025-07-23 PROCEDURE — 3078F DIAST BP <80 MM HG: CPT | Performed by: STUDENT IN AN ORGANIZED HEALTH CARE EDUCATION/TRAINING PROGRAM

## 2025-07-23 PROCEDURE — 71046 X-RAY EXAM CHEST 2 VIEWS: CPT

## 2025-07-23 PROCEDURE — 93000 ELECTROCARDIOGRAM COMPLETE: CPT | Performed by: STUDENT IN AN ORGANIZED HEALTH CARE EDUCATION/TRAINING PROGRAM

## 2025-07-23 ASSESSMENT — PAIN SCALES - GENERAL: PAINLEVEL_OUTOF10: 0-NO PAIN

## 2025-07-23 NOTE — PROGRESS NOTES
"Subjective   Patient ID: Matt Huber is a 74 y.o. male who presents for No chief complaint on file..  History of Present Illness  Matt Huber is a 74 year old male who presents with shortness of breath.    Shortness of breath has been present since late June, worsening with physical exertion, such as climbing stairs. He denies associated chest pain. He experiences significant fatigue after minimal exertion, such as walking for 20 minutes on flat ground, and uses a cane due to leg heaviness.    He has a history of chronic fatigue syndrome or fibromyalgia diagnosed 25 years ago, which previously caused severe exhaustion. He is currently in physical therapy following spinal surgery, which involves varying levels of exertion.    He takes 150 mg of metoprolol daily for the past 15 years. He has not adjusted his medication despite knowing others with similar conditions have reduced their doses due to fatigue. He has not discussed these symptoms with his cardiologist but has an appointment in four weeks. He usually notices atrial fibrillation by a constriction around his neck but has not experienced this sensation recently.      PMHx, FHx, Social Hx, Surg Hx personally reviewed at this appointment. No pertinent findings and/or changes from prior (if applicable).    ROS: Unless specified above, pt denies wt gain/loss f/c HA LoC CP SOB NVDC. See HPI above, and scanned sheet (if applicable). All other systems are reviewed and are without complaint.     Objective     /76   Pulse 54   Temp 36.2 °C (97.2 °F)   Ht 1.626 m (5' 4\")   Wt 89.4 kg (197 lb)   SpO2 96%   BMI 33.81 kg/m²      Physical Exam  Gen: elderly appearance. AAO x 3  HEENT: NC/AT. Anicteric sclera, symmetric pupils.   Neck: Soft, supple. No LAD. No goiter.   CV: irr irr, normal rate; nl s1s2 no m/r/g  Pulm: CTAB no w/r/r, good air exchange  GI: soft NTND BS+ no hsm  Ext: WWP no edema  Neuro: II-XII grossly intact, nonfocal systemic " findings  MSK: 5/5 strength b/l UE and LE  Gait: unremarkable      Current Outpatient Medications   Medication Instructions    acetaminophen (TYLENOL) 1,000 mg, 2 times daily    apixaban (ELIQUIS) 5 mg, oral, 2 times daily    atorvastatin (LIPITOR) 80 mg, oral, Daily    buPROPion XL (Wellbutrin XL) 150 mg 24 hr tablet 2 tablets, Daily    chlorthalidone (Hygroton) 25 mg tablet TAKE 1/2 TABLET ONE TIME DAILY    famotidine (PEPCID) 40 mg, oral, Nightly    flecainide (TAMBOCOR) 300 mg, Once as needed    gabapentin (Neurontin) 300 mg capsule TAKE 1 CAPSULE DAILY AT BEDTIME IF TOLERATING WELL MAY INCREASE TO 2 CAPSULE DAILY AT BEDTIME    levothyroxine (SYNTHROID, LEVOXYL) 88 mcg, oral, Daily    metoprolol succinate XL (Toprol-XL) 50 mg 24 hr tablet TAKE 3 TABLETS ONE TIME DAILY    multivitamin-children's (Cerovite, Jr) chewable tablet 1 tablet, Daily    omeprazole (PriLOSEC) 40 mg DR capsule TAKE 1 CAPSULE TWICE DAILY BEFORE MEALS    sildenafil (VIAGRA) 50 mg, oral, As needed    tamsulosin (FLOMAX) 0.4 mg, oral, Daily        Lab Results   Component Value Date    WBC 6.7 10/08/2024    HGB 14.1 10/08/2024    HCT 42.1 10/08/2024     10/08/2024    CHOL 150 10/08/2024    TRIG 270 (H) 10/08/2024    HDL 45.5 10/08/2024    ALT 39 10/08/2024    AST 29 10/08/2024     10/08/2024    K 4.1 10/08/2024     10/08/2024    CREATININE 1.04 10/08/2024    BUN 14 10/08/2024    CO2 32 10/08/2024    TSH 4.45 (H) 12/04/2024    PSA 1.16 10/22/2018    INR 1.1 01/16/2023    HGBA1C 6.0 09/09/2024     par     XR shoulder right 2+ views  Narrative: Interpreted By:  Ana Arrington,   STUDY:  XR SHOULDER RIGHT 2+ VIEWS;  4/1/2025 3:08 pm      INDICATION:  Signs/Symptoms:pain.      COMPARISON:  None.      ACCESSION NUMBER(S):  [id number]      ORDERING CLINICIAN:  JESSIKA GAVIRIA      FINDINGS:  There is no evidence for acute fracture or dislocation.      Osteopenia is present. Hypertrophic bony changes are seen at  the  acromioclavicular joint, which severely narrow the joint.  Narrowing  of the glenohumeral joint is seen with associated sclerosis and spur  formation.      No lytic or blastic lesions are seen.      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      Impression:         Degenerative changes.      MACRO:  None      Signed by: Ana Arrington 4/2/2025 6:18 PM  Dictation workstation:   [id number]         Assessment & Plan  Exertional Dyspnea  Dyspnea on exertion without chest pain. Differential includes cardiac issues, anemia, or electrolyte imbalances. Aging and atrial fibrillation are contributing factors. Cardiac ischemia less likely.  - Order EKG to assess cardiac rhythm; c/w AFib  - Order chest X-ray to evaluate pulmonary status.  - Order CBC and CMP to check for anemia and electrolyte imbalances.  - Contact cardiology team to expedite appointment with cardiologist.  - Consider stress test or EP procedure based on cardiology evaluation.     Atrial Fibrillation  Paroxysmal atrial fibrillation at 80 bpm. AFib's contribution to exertional dyspnea considered.  - Coordinate with cardiology team for further management.    Metoprolol Use  On 150 mg metoprolol succinate daily. Concerns about fatigue related to medication. Dosage appropriate, heart rate not too slow.  - Discuss metoprolol dosage with cardiologist during upcoming appointment.    Follow-up  Routine cardiology follow-up scheduled for August 19. Expediting due to symptoms.  - Attempt to schedule cardiology appointment within the next two weeks.          Alex Tesfaye MD       This medical note was created with the assistance of artificial intelligence (AI) for documentation purposes. The content has been reviewed and confirmed by the healthcare provider for accuracy and completeness. Patient consented to the use of audio recording and use of AI  during their visit.

## 2025-07-23 NOTE — PATIENT INSTRUCTIONS
Please stop at any  lab ([address] if you choose to use my building) to complete your blood and/or urine work that I've ordered for you.    I will contact you with the results at my soonest convenience. I strongly urge you to use [employer] as this is the quickest and easiest way to access your results and receive my correspondences.     Stop by the radiology department on the first floor of the building for your x-rays.     I have reached out to your cardiology team for an expedited assessment.

## 2025-07-24 LAB
ALBUMIN SERPL-MCNC: 4.4 G/DL (ref 3.6–5.1)
ALP SERPL-CCNC: 97 U/L (ref 35–144)
ALT SERPL-CCNC: 30 U/L (ref 9–46)
ANION GAP SERPL CALCULATED.4IONS-SCNC: 10 MMOL/L (CALC) (ref 7–17)
AST SERPL-CCNC: 18 U/L (ref 10–35)
BASOPHILS # BLD AUTO: 12 CELLS/UL (ref 0–200)
BASOPHILS NFR BLD AUTO: 0.1 %
BILIRUB SERPL-MCNC: 0.6 MG/DL (ref 0.2–1.2)
BUN SERPL-MCNC: 23 MG/DL (ref 7–25)
CALCIUM SERPL-MCNC: 9.6 MG/DL (ref 8.6–10.3)
CHLORIDE SERPL-SCNC: 103 MMOL/L (ref 98–110)
CO2 SERPL-SCNC: 24 MMOL/L (ref 20–32)
CREAT SERPL-MCNC: 1.08 MG/DL (ref 0.7–1.28)
EGFRCR SERPLBLD CKD-EPI 2021: 72 ML/MIN/1.73M2
EOSINOPHIL # BLD AUTO: 0 CELLS/UL (ref 15–500)
EOSINOPHIL NFR BLD AUTO: 0 %
ERYTHROCYTE [DISTWIDTH] IN BLOOD BY AUTOMATED COUNT: 11.9 % (ref 11–15)
GLUCOSE SERPL-MCNC: 162 MG/DL (ref 65–139)
HCT VFR BLD AUTO: 38.1 % (ref 38.5–50)
HGB BLD-MCNC: 12.9 G/DL (ref 13.2–17.1)
LYMPHOCYTES # BLD AUTO: 1082 CELLS/UL (ref 850–3900)
LYMPHOCYTES NFR BLD AUTO: 8.8 %
MCH RBC QN AUTO: 33.9 PG (ref 27–33)
MCHC RBC AUTO-ENTMCNC: 33.9 G/DL (ref 32–36)
MCV RBC AUTO: 100 FL (ref 80–100)
MONOCYTES # BLD AUTO: 713 CELLS/UL (ref 200–950)
MONOCYTES NFR BLD AUTO: 5.8 %
NEUTROPHILS # BLD AUTO: ABNORMAL CELLS/UL (ref 1500–7800)
NEUTROPHILS NFR BLD AUTO: 85.3 %
PLATELET # BLD AUTO: 227 THOUSAND/UL (ref 140–400)
PMV BLD REES-ECKER: 10.8 FL (ref 7.5–12.5)
POTASSIUM SERPL-SCNC: 3.7 MMOL/L (ref 3.5–5.3)
PROT SERPL-MCNC: 6.8 G/DL (ref 6.1–8.1)
RBC # BLD AUTO: 3.81 MILLION/UL (ref 4.2–5.8)
SODIUM SERPL-SCNC: 137 MMOL/L (ref 135–146)
WBC # BLD AUTO: 12.3 THOUSAND/UL (ref 3.8–10.8)

## 2025-07-24 RX ORDER — TRIAMCINOLONE ACETONIDE 40 MG/ML
40 INJECTION, SUSPENSION INTRA-ARTICULAR; INTRAMUSCULAR
Status: COMPLETED | OUTPATIENT
Start: 2025-07-22 | End: 2025-07-22

## 2025-07-24 RX ORDER — LIDOCAINE HYDROCHLORIDE 10 MG/ML
4 INJECTION, SOLUTION INFILTRATION; PERINEURAL
Status: COMPLETED | OUTPATIENT
Start: 2025-07-22 | End: 2025-07-22

## 2025-07-25 ENCOUNTER — APPOINTMENT (OUTPATIENT)
Dept: RADIOLOGY | Facility: HOSPITAL | Age: 75
End: 2025-07-25
Payer: MEDICARE

## 2025-07-25 ENCOUNTER — HOSPITAL ENCOUNTER (EMERGENCY)
Facility: HOSPITAL | Age: 75
Discharge: HOME | End: 2025-07-25
Attending: EMERGENCY MEDICINE
Payer: MEDICARE

## 2025-07-25 ENCOUNTER — APPOINTMENT (OUTPATIENT)
Dept: CARDIOLOGY | Facility: HOSPITAL | Age: 75
End: 2025-07-25
Payer: MEDICARE

## 2025-07-25 VITALS
RESPIRATION RATE: 14 BRPM | HEIGHT: 64 IN | BODY MASS INDEX: 33.46 KG/M2 | HEART RATE: 77 BPM | TEMPERATURE: 97.3 F | SYSTOLIC BLOOD PRESSURE: 120 MMHG | DIASTOLIC BLOOD PRESSURE: 94 MMHG | OXYGEN SATURATION: 97 % | WEIGHT: 196 LBS

## 2025-07-25 DIAGNOSIS — I48.91 ATRIAL FIBRILLATION, UNSPECIFIED TYPE (MULTI): ICD-10-CM

## 2025-07-25 DIAGNOSIS — R53.1 GENERALIZED WEAKNESS: Primary | ICD-10-CM

## 2025-07-25 LAB
ALBUMIN SERPL BCP-MCNC: 4.2 G/DL (ref 3.4–5)
ALP SERPL-CCNC: 93 U/L (ref 33–136)
ALT SERPL W P-5'-P-CCNC: 39 U/L (ref 10–52)
ANION GAP SERPL CALC-SCNC: 9 MMOL/L (ref 10–20)
AST SERPL W P-5'-P-CCNC: 28 U/L (ref 9–39)
BASOPHILS # BLD AUTO: 0.05 X10*3/UL (ref 0–0.1)
BASOPHILS NFR BLD AUTO: 0.6 %
BILIRUB SERPL-MCNC: 0.6 MG/DL (ref 0–1.2)
BUN SERPL-MCNC: 18 MG/DL (ref 6–23)
CALCIUM SERPL-MCNC: 9.6 MG/DL (ref 8.6–10.3)
CARDIAC TROPONIN I PNL SERPL HS: 18 NG/L (ref 0–20)
CARDIAC TROPONIN I PNL SERPL HS: 21 NG/L (ref 0–20)
CHLORIDE SERPL-SCNC: 102 MMOL/L (ref 98–107)
CO2 SERPL-SCNC: 32 MMOL/L (ref 21–32)
CREAT SERPL-MCNC: 1.06 MG/DL (ref 0.5–1.3)
EGFRCR SERPLBLD CKD-EPI 2021: 74 ML/MIN/1.73M*2
EOSINOPHIL # BLD AUTO: 0.12 X10*3/UL (ref 0–0.4)
EOSINOPHIL NFR BLD AUTO: 1.4 %
ERYTHROCYTE [DISTWIDTH] IN BLOOD BY AUTOMATED COUNT: 12.6 % (ref 11.5–14.5)
GLUCOSE SERPL-MCNC: 104 MG/DL (ref 74–99)
HCT VFR BLD AUTO: 39.8 % (ref 41–52)
HGB BLD-MCNC: 13.1 G/DL (ref 13.5–17.5)
IMM GRANULOCYTES # BLD AUTO: 0.04 X10*3/UL (ref 0–0.5)
IMM GRANULOCYTES NFR BLD AUTO: 0.5 % (ref 0–0.9)
LYMPHOCYTES # BLD AUTO: 1.25 X10*3/UL (ref 0.8–3)
LYMPHOCYTES NFR BLD AUTO: 14.7 %
MAGNESIUM SERPL-MCNC: 2.19 MG/DL (ref 1.6–2.4)
MCH RBC QN AUTO: 32.3 PG (ref 26–34)
MCHC RBC AUTO-ENTMCNC: 32.9 G/DL (ref 32–36)
MCV RBC AUTO: 98 FL (ref 80–100)
MONOCYTES # BLD AUTO: 0.87 X10*3/UL (ref 0.05–0.8)
MONOCYTES NFR BLD AUTO: 10.3 %
NEUTROPHILS # BLD AUTO: 6.15 X10*3/UL (ref 1.6–5.5)
NEUTROPHILS NFR BLD AUTO: 72.5 %
NRBC BLD-RTO: 0 /100 WBCS (ref 0–0)
PLATELET # BLD AUTO: 236 X10*3/UL (ref 150–450)
POTASSIUM SERPL-SCNC: 3.9 MMOL/L (ref 3.5–5.3)
PROT SERPL-MCNC: 6.9 G/DL (ref 6.4–8.2)
RBC # BLD AUTO: 4.05 X10*6/UL (ref 4.5–5.9)
SODIUM SERPL-SCNC: 139 MMOL/L (ref 136–145)
TSH SERPL-ACNC: 3.84 MIU/L (ref 0.44–3.98)
WBC # BLD AUTO: 8.5 X10*3/UL (ref 4.4–11.3)

## 2025-07-25 PROCEDURE — 84484 ASSAY OF TROPONIN QUANT: CPT | Performed by: EMERGENCY MEDICINE

## 2025-07-25 PROCEDURE — 80053 COMPREHEN METABOLIC PANEL: CPT

## 2025-07-25 PROCEDURE — 93005 ELECTROCARDIOGRAM TRACING: CPT

## 2025-07-25 PROCEDURE — 84443 ASSAY THYROID STIM HORMONE: CPT

## 2025-07-25 PROCEDURE — 36415 COLL VENOUS BLD VENIPUNCTURE: CPT

## 2025-07-25 PROCEDURE — 83735 ASSAY OF MAGNESIUM: CPT

## 2025-07-25 PROCEDURE — 84484 ASSAY OF TROPONIN QUANT: CPT

## 2025-07-25 PROCEDURE — 99285 EMERGENCY DEPT VISIT HI MDM: CPT | Mod: 25 | Performed by: EMERGENCY MEDICINE

## 2025-07-25 PROCEDURE — 85025 COMPLETE CBC W/AUTO DIFF WBC: CPT

## 2025-07-25 PROCEDURE — 36415 COLL VENOUS BLD VENIPUNCTURE: CPT | Performed by: EMERGENCY MEDICINE

## 2025-07-25 PROCEDURE — 71046 X-RAY EXAM CHEST 2 VIEWS: CPT

## 2025-07-25 PROCEDURE — 71046 X-RAY EXAM CHEST 2 VIEWS: CPT | Performed by: RADIOLOGY

## 2025-07-25 ASSESSMENT — PAIN SCALES - GENERAL
PAINLEVEL_OUTOF10: 0 - NO PAIN

## 2025-07-25 ASSESSMENT — PAIN - FUNCTIONAL ASSESSMENT
PAIN_FUNCTIONAL_ASSESSMENT: 0-10
PAIN_FUNCTIONAL_ASSESSMENT: 0-10

## 2025-07-25 ASSESSMENT — PAIN DESCRIPTION - PROGRESSION: CLINICAL_PROGRESSION: OTHER (COMMENT)

## 2025-07-25 NOTE — ED TRIAGE NOTES
TRIAGE NOTE   I saw the patient as the Clinician in Triage and performed a brief history and physical exam, established acuity, and ordered appropriate tests to develop basic plan of care. Patient will be seen by an [facility], resident and/or physician who will independently evaluate the patient. Please see subsequent provider notes for further details and disposition.     Brief HPI: In brief, Matt Huber is a 74 y.o. male that presents for a fib.  See PCP couple days ago for weakness , Fatigue and shortness of breath and was found to have A-fib.  PCP told him to come to the ED or goes to be seen by cardiology on 8/4.    Focused Physical exam:   Irregular rate irregular rhythm  Plan/MDM:   Initiating basic labs, TSH, magnesium, troponin, EKG, chest x-ray.  Patient will be seen in the back in the ED for further evaluation and treatment.  I will not be following with this patient during his ED visit.  This is a preliminary evaluation during triage.  I evaluated this patient in triage with the RN. Due to the patients complaint labs and or imaging were ordered by myself in an attempt to expedite patient care however I am not participating in care after evaluation. This is a preliminary assessment. Pt does not appear in acute distress at this time. They will have a full evaluation as soon as possible. They will be cared for by another provider who will possibly order more labs, imaging or interventions. Pt did not have a full ROS or PE completed by myself however below is a summary with reasons for orders.  For the remainder of the patient's workup and ED course, please refer to the main ED provider note. We discussed need for diagnostic testing including laboratory studies and imaging.  We also discussed that patient may be asked to wait in the waiting room while these tests are pending.  They understand that if they choose to leave without having the testing completed or resulted that we cannot rule out acute life  threatening illnesses and the risks involved could lead to worsening condition, permanent disability or even death.     Please see subsequent provider note for further details and disposition

## 2025-07-25 NOTE — ED TRIAGE NOTES
Pt to ED from home for new onset afib. Pt's wife endorses he was seen by his PCP Wednesday for fatigue and weakness, and SOB. Pt had a history of afib 13 years ago and is unable to get in with his cardiologist until August. Pt takes Eliquis.

## 2025-07-26 NOTE — ED PROVIDER NOTES
Emergency Department Provider Note       HPI  Patient is a 74-year-old male with a past medical history significant for remote atrial fibrillation currently on Eliquis, metoprolol, hypertension, breast esophagitis, CAD who presents to the emergency room with a chief complaint of malaise, weakness and atrial fibrillation.  He states that since June he has been feeling all of the above.  He gets winded when he goes up to steps and feels very weak when he does minimal work.  He sometimes gets lightheaded.  He has no chest pain and has not experienced it recently.  He denies any cough, fever, chills, nausea, vomiting, syncope or near syncope.  Denies any leg pain or swelling.  He is concerned because he was seen by his primary care physician earlier this week and was found to be in atrial fibrillation which she has not been in for several years.  He has a follow-up appointment on 4 August with cardiology, he is a patient of Dr. Avilez, but he was unsure about whether or not he should wait until then.  At present time while at rest and sitting in the chair in the room he is feeling stable.  His symptoms at home are not debilitating enough to make it impossible to perform his ADLs at this time.  His wife takes good care of him and is a physician.    PMHx: As above  PSHx: Denies pertinent  FamilyHx: Denies pertinent  SocialHx: Denies  Allergies: Per EMR  Medications: See Medication Reconciliation     ROS  As above otherwise denies    Physical Exam    GENERAL: Awake and Alert, No Acute Distress  HEENT: AT/NC, PERRL, EOMI, Normal Oropharynx, No Signs of Dehydration  NECK: Normal Inspection, No JVD  CARDIOVASCULAR: Irregularly irregular but not tachycardic, No M/R/G  RESPIRATORY: CTA Bilaterally, No Wheezes, Rales or Rhonchi, Chest Wall Non-tender  ABDOMEN: Soft, non-tender abdomen, Normal Bowel Sounds, No Distention  BACK: No CVA Tenderness  SKIN: Normal Color, Warm, Dry, No Rashes   EXTREMITIES: Non-Tender, Full ROM, No  Pedal Edema  NEURO: A&O x 3, Normal Motor and Sensation, Normal Mood and Affect    Nursing Assessment and Vitals Reviewed    EKG showed atrial fibrillation at 66 bpm.  There are no significant interval prolongations or ischemic ST changes.  No T wave inversions or axis deviation.    Medical Decision  Patient is a 74-year-old male with a past medical history significant for remote atrial fibrillation currently on Eliquis, metoprolol, hypertension, breast esophagitis, CAD who presents to the emergency room with a chief complaint of malaise, weakness and atrial fibrillation.  He states that since June he has been feeling all of the above.  He gets winded when he goes up to steps and feels very weak when he does minimal work.  He sometimes gets lightheaded.  He has no chest pain and has not experienced it recently.  He denies any cough, fever, chills, nausea, vomiting, syncope or near syncope.  Denies any leg pain or swelling.  He is concerned because he was seen by his primary care physician earlier this week and was found to be in atrial fibrillation which she has not been in for several years.  He has a follow-up appointment on 4 August with cardiology, he is a patient of Dr. Avilez, but he was unsure about whether or not he should wait until then.  At present time while at rest and sitting in the chair in the room he is feeling stable.  His symptoms at home are not debilitating enough to make it impossible to perform his ADLs at this time.  His wife takes good care of him and is a physician.    On evaluation patient is sitting in the chair in the room very well-appearing and in no acute distress.  Lungs are clear and heart is irregularly irregular but not tachycardic.  He shows no signs of fluid overload and his vital signs are stable.    Workup for patient thus far has included labs that did not reveal any emergent electrolyte imbalance.  Troponin is 21 pending repeat.  TSH is within normal limits.  He has no  leukocytosis and his anemia is very mild and improved from prior.  He has some elevation in his monocytes mildly so on his neutrophils.  Chest x-ray is unremarkable.    Patient was discussed with cardiology on-call.  Given his symptomatology patient would possibly have cardioversion but likely not until Monday.  This is discussed with patient and partner at bedside.    Repeat troponin was within normal limits.  I had several extensive discussions with family and patient.  I offered admission multiple times as he reported being symptomatic at home but patient states that he is very much insisting on going home at this time and he feels like he can manage.  He feels like his partner can take good care of him and they will return if anything at all gets worse rather than better.  Patient has decision-making capacity at this time and understands the risks and benefits.  He will follow-up closely with his primary care physician and cardiology.  He will return immediately if concerning symptoms as discussed.      Repeat troponin  Brianne Vides MD  Emergency Medicine                                                       Brianne Vides MD  07/25/25 2399

## 2025-07-26 NOTE — DISCHARGE INSTRUCTIONS
Please make sure to follow-up closely with your primary care physician and with cardiology.  Return immediately if concerning symptoms, as discussed.

## 2025-07-30 ENCOUNTER — OFFICE VISIT (OUTPATIENT)
Dept: CARDIOLOGY | Facility: HOSPITAL | Age: 75
End: 2025-07-30
Payer: MEDICARE

## 2025-07-30 VITALS
HEART RATE: 61 BPM | SYSTOLIC BLOOD PRESSURE: 133 MMHG | DIASTOLIC BLOOD PRESSURE: 80 MMHG | HEIGHT: 64 IN | RESPIRATION RATE: 20 BRPM | WEIGHT: 191.9 LBS | BODY MASS INDEX: 32.76 KG/M2 | OXYGEN SATURATION: 97 %

## 2025-07-30 DIAGNOSIS — I25.10 CORONARY ARTERIOSCLEROSIS: ICD-10-CM

## 2025-07-30 DIAGNOSIS — I50.31 ACUTE DIASTOLIC HEART FAILURE: ICD-10-CM

## 2025-07-30 DIAGNOSIS — I48.0 PAROXYSMAL ATRIAL FIBRILLATION (MULTI): Primary | ICD-10-CM

## 2025-07-30 DIAGNOSIS — E78.00 HYPERCHOLESTEROLEMIA: ICD-10-CM

## 2025-07-30 DIAGNOSIS — I10 BENIGN ESSENTIAL HYPERTENSION: ICD-10-CM

## 2025-07-30 LAB
ATRIAL RATE: 61 BPM
P AXIS: 29 DEGREES
P OFFSET: 201 MS
P ONSET: 125 MS
PR INTERVAL: 186 MS
Q ONSET: 218 MS
Q ONSET: 219 MS
QRS COUNT: 10 BEATS
QRS COUNT: 11 BEATS
QRS DURATION: 84 MS
QRS DURATION: 90 MS
QT INTERVAL: 426 MS
QT INTERVAL: 470 MS
QTC CALCULATION(BAZETT): 446 MS
QTC CALCULATION(BAZETT): 473 MS
QTC FREDERICIA: 440 MS
QTC FREDERICIA: 472 MS
R AXIS: 29 DEGREES
R AXIS: 36 DEGREES
T AXIS: -21 DEGREES
T AXIS: 21 DEGREES
T OFFSET: 432 MS
T OFFSET: 453 MS
VENTRICULAR RATE: 61 BPM
VENTRICULAR RATE: 66 BPM

## 2025-07-30 PROCEDURE — G2211 COMPLEX E/M VISIT ADD ON: HCPCS | Performed by: INTERNAL MEDICINE

## 2025-07-30 PROCEDURE — 1160F RVW MEDS BY RX/DR IN RCRD: CPT | Performed by: INTERNAL MEDICINE

## 2025-07-30 PROCEDURE — 3079F DIAST BP 80-89 MM HG: CPT | Performed by: INTERNAL MEDICINE

## 2025-07-30 PROCEDURE — 1159F MED LIST DOCD IN RCRD: CPT | Performed by: INTERNAL MEDICINE

## 2025-07-30 PROCEDURE — 99212 OFFICE O/P EST SF 10 MIN: CPT

## 2025-07-30 PROCEDURE — 99215 OFFICE O/P EST HI 40 MIN: CPT | Performed by: INTERNAL MEDICINE

## 2025-07-30 PROCEDURE — 3075F SYST BP GE 130 - 139MM HG: CPT | Performed by: INTERNAL MEDICINE

## 2025-07-30 PROCEDURE — 3008F BODY MASS INDEX DOCD: CPT | Performed by: INTERNAL MEDICINE

## 2025-07-30 PROCEDURE — 93005 ELECTROCARDIOGRAM TRACING: CPT | Performed by: INTERNAL MEDICINE

## 2025-07-30 RX ORDER — POTASSIUM CHLORIDE 20 MEQ/1
20 TABLET, EXTENDED RELEASE ORAL DAILY
Qty: 90 TABLET | Refills: 3 | Status: SHIPPED | OUTPATIENT
Start: 2025-07-30 | End: 2026-07-30

## 2025-07-30 RX ORDER — FUROSEMIDE 40 MG/1
40 TABLET ORAL DAILY
Qty: 90 TABLET | Refills: 3 | Status: SHIPPED | OUTPATIENT
Start: 2025-07-30 | End: 2026-07-30

## 2025-07-30 NOTE — PATIENT INSTRUCTIONS
Stop chlorthalidone.  Start furosemide 40 mg daily.  Start potassium 20 mEq daily.  Check a BMP and BNP in 1 week.  Follow-up with Dr. Araujo.  Check an echocardiogram.  Clinical pharmacy referral.  Follow-up in 2 weeks as scheduled.

## 2025-07-30 NOTE — PROGRESS NOTES
Primary Care Physician: Alex Tesfaye MD  Date of Visit: 07/30/2025  9:20 AM EDT  Location of visit: [facility]     Chief Complaint:   Chief Complaint   Patient presents with    Follow-up        HPI / Summary:   Matt Huber is a 74 y.o. male who presents for followup.  He complains of a 1 month history of exertional fatigue and dyspnea on exertion when climbing 1 flight of stairs.  He saw his primary physician last week and was found to be in atrial fibrillation.  He also went to the emergency department and subsequently discharged.  He noted that his pulse was regular 3 days ago.  He notes some improvement in his dyspnea and fatigue in the last few days.  The patient denies chest pain, palpitations, lightheadedness, syncope, orthopnea, paroxysmal nocturnal dyspnea, lower extremity edema, or bleeding problems.        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, of the knee 08/30/2023    Displaced fracture of coronoid process of left ulna, initial encounter for closed fracture 04/22/2015    Fracture of coronoid process of left ulna    Exotropia of right eye     Hypermetropia of both eyes     Hypertension     Lumbar stenosis with neurogenic claudication 10/21/2023    Medial epicondylitis, left elbow 04/13/2015    Medial epicondylitis of left elbow    Monocular diplopia     Obstructive sleep apnea 10/21/2023    Osteoarthritis of left hip 10/21/2023    Other chondrocalcinosis, right knee 08/05/2015    Chondrocalcinosis of knee, right    Other enthesopathies, not elsewhere classified 03/31/2014    Tendonitis of shoulder    Other enthesopathies, not elsewhere classified 05/22/2015     Tendonitis of elbow, left    Pain in left knee 01/10/2020    Left knee pain    Pain in unspecified hip 02/24/2016    Hip pain    Pain in unspecified shoulder 03/18/2014    Shoulder pain    Paroxysmal atrial fibrillation (Multi) 11/22/2022    Paroxysmal atrial fibrillation    Personal history of other diseases of the circulatory system 01/31/2014    History of mitral valve insufficiency    Personal history of other diseases of the musculoskeletal system and connective tissue 12/30/2014    History of trigger finger    Prediabetes 10/21/2023    Presbyopia of both eyes     Strabismus     Type 2 macular telangiectasis     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    Unilateral primary osteoarthritis, left knee 01/21/2019    Primary localized osteoarthrosis of left lower leg    Unspecified acute lower respiratory infection 06/10/2016    Acute lower respiratory tract infection    Unspecified injury of left wrist, hand and finger(s), initial encounter 05/22/2015    Left wrist injury    Unspecified injury of unspecified elbow, initial encounter 05/22/2015    Elbow injury        Past Surgical History:   Procedure Laterality Date    BACK SURGERY  02/04/2014    Back Surgery    CATARACT EXTRACTION W/  INTRAOCULAR LENS IMPLANT Left 09/13/2023    Dr Tsang    CATARACT EXTRACTION W/  INTRAOCULAR LENS IMPLANT Right 08/22/2023    Dr Tsang    KNEE SURGERY  02/04/2014    Knee Surgery Right    KNEE SURGERY  02/04/2014    Knee Surgery Left    MR HEAD ANGIO WO IV CONTRAST  01/06/2016    MR HEAD ANGIO WO IV CONTRAST 1/6/2016 [facility]    MR NECK ANGIO WO IV CONTRAST  01/06/2016    MR NECK ANGIO WO IV CONTRAST 1/6/2016 [facility]    OTHER SURGICAL HISTORY  08/19/2020    Strabismus surgery    OTHER SURGICAL HISTORY  08/19/2020    Knee replacement    OTHER SURGICAL HISTORY  02/04/2014    Wrist Carpectomy    SPINE SURGERY  01/2023    1.  L1/L2-L5/S1 bilateral laminectomy facetectomy and foraminotomies  2.   L5-S1 transforaminal lumbar interbody fusion with titanium interbody cage,  local bone graft and allograft chips  3.  T4 to ilium thoracolumbar fusion with Solera instrumentation, local bone  graft, allograft chips, demineralized bone matrix, and bone marrow aspirate 4.  Cement augmentation T4-T5 with Kyphon cement    TOTAL KNEE ARTHROPLASTY  08/09/2017    Total Knee Arthroplasty          Social History:   reports that he quit smoking about 49 years ago. His smoking use included cigarettes. He has been exposed to tobacco smoke. He has never used smokeless tobacco. He reports current alcohol use. He reports that he does not use drugs.     Family History:  family history includes Colon cancer in his brother; Coronary artery disease in his father; Heart attack in his brother and father; Heart failure in his mother; Liver disease in his mother.      Allergies:  Allergies   Allergen Reactions    Ace Inhibitors Cough and Other     severe cough    Codeine Nausea Only     nausea    House Dust Cough    Pollen Extracts Cough    Sulfa (Sulfonamide Antibiotics) Nausea/vomiting       Outpatient Medications:  Current Outpatient Medications   Medication Instructions    acetaminophen (TYLENOL) 1,000 mg, 2 times daily    apixaban (ELIQUIS) 5 mg, oral, 2 times daily    atorvastatin (LIPITOR) 80 mg, oral, Daily    buPROPion XL (Wellbutrin XL) 150 mg 24 hr tablet 2 tablets, Daily    chlorthalidone (Hygroton) 25 mg tablet TAKE 1/2 TABLET ONE TIME DAILY    famotidine (PEPCID) 40 mg, oral, Nightly    flecainide (TAMBOCOR) 300 mg, Once as needed    gabapentin (Neurontin) 300 mg capsule TAKE 1 CAPSULE DAILY AT BEDTIME IF TOLERATING WELL MAY INCREASE TO 2 CAPSULE DAILY AT BEDTIME    levothyroxine (SYNTHROID, LEVOXYL) 88 mcg, oral, Daily    metoprolol succinate XL (Toprol-XL) 50 mg 24 hr tablet TAKE 3 TABLETS ONE TIME DAILY    multivitamin-children's (Cerovite, Jr) chewable tablet 1 tablet, Daily    omeprazole (PriLOSEC) 40 mg DR capsule TAKE  "1 CAPSULE TWICE DAILY BEFORE MEALS    sildenafil (VIAGRA) 50 mg, oral, As needed    tamsulosin (FLOMAX) 0.4 mg, oral, Daily       Physical Exam:  Vitals:    07/30/25 0929   BP: 133/80   BP Location: Left arm   Patient Position: Sitting   BP Cuff Size: Adult   Pulse: 61   Resp: 20   SpO2: 97%   Weight: 87 kg (191 lb 14.4 oz)   Height: 1.626 m (5' 4\")       Wt Readings from Last 5 Encounters:   07/30/25 87 kg (191 lb 14.4 oz)   07/25/25 88.9 kg (196 lb)   07/23/25 89.4 kg (197 lb)   07/22/25 88.5 kg (195 lb)   03/04/25 88.9 kg (196 lb)     Body mass index is 32.94 kg/m².   General: Well-developed well-nourished in no acute distress  HEENT: Normocephalic atraumatic  Neck: Supple, JVP is 10 to 12 cm of water positive hepatojugular reflux 2+ carotid pulses without bruit  Pulmonary: Normal respiratory effort, clear to auscultation  Cardiovascular: No right ventricular heave, normal S1 and S2, no murmurs rubs or gallops  Abdomen: Soft nontender nondistended  Extremities: Warm without edema 2+ radial pulses bilaterally   Neurologic: Alert and oriented x3  Psychiatric: Normal mood and affect     Last Labs:  CMP:  Recent Labs     07/25/25 1727 07/23/25  1656 10/08/24  1445    137 139   K 3.9 3.7 4.1    103 101   CO2 32 24 32   ANIONGAP 9* 10 10   BUN 18 23 14   CREATININE 1.06 1.08 1.04   EGFR 74 72 75   GLUCOSE 104* 162* 88     Recent Labs     07/25/25  1727 07/23/25  1656 10/08/24  1445   ALBUMIN 4.2 4.4 4.5   ALKPHOS 93 97 82   ALT 39 30 39   AST 28 18 29   BILITOT 0.6 0.6 0.7     CBC:  Recent Labs     07/25/25  1727 07/23/25  1656 10/08/24  1445   WBC 8.5 12.3* 6.7   HGB 13.1* 12.9* 14.1   HCT 39.8* 38.1* 42.1    227 214   MCV 98 100.0 97     COAG:   Recent Labs     01/16/23  1410 09/13/22  1337   INR 1.1 1.4*     HEME/ENDO:  Recent Labs     07/25/25  1727 12/04/24  1444 10/08/24  1445 09/09/24  1449 08/30/23  1454 09/13/22  1150   TSH 3.84 4.45* 5.04*  --    < > 3.93   HGBA1C  --   --   --  6.0  --  " 5.8*    < > = values in this interval not displayed.      CARDIAC:   Recent Labs     07/25/25 2048 07/25/25  1727   TROPHS 18 21*     Recent Labs     10/08/24  1445 08/30/23  1454 09/13/22  1150 09/02/21  0904   CHOL 150 109 126 119   LDLF  --  35 66 52   LDLCALC 51  --   --   --    HDL 45.5 44.2 39.5* 37.3*   TRIG 270* 147 104 149       Last Cardiology Tests:  ECG:  Electrocardiogram performed today that I reviewed shows normal sinus rhythm and is normal.    I reviewed an electrocardiogram from July 25, 2025 that showed atrial fibrillation nonspecific T wave abnormality.      Echo:  Echocardiogram September 12, 2022  CONCLUSIONS:   1. Left ventricular systolic function is normal with a 60-65% estimated ejection fraction.   2. The left atrium is severely dilated.   3. There is moderate mitral annular calcification.      Cath:  Cardiac catheterization May 2014      * Mild nonobstructive CAD in a right dominant system.      * Normal LVEDP.    Stress Test:  Stress Results:  No results found for this or any previous visit from the past 365 days.         Cardiac Imaging:  CT abdomen and pelvis February 2023  Aorta and vena cava are normal in size. Patchy atherosclerosis is present.     Cardiac MRI January 2017  IMPRESSION:  1. The left ventricular function and wall thickness is within normal  limits. There is no evidence of hypertrophic cardiomyopathy. No  abnormal delayed contrast enhancement is seen to suggest prior  ischemic damage or an infiltrative process..  2. Normal aortic, mitral, and tricuspid valve function.    Assessment/Plan   Diagnoses and all orders for this visit:  Paroxysmal atrial fibrillation (Multi)  -     ECG 12 lead (Clinic Performed)  -     Transthoracic echo (TTE) complete; Future  -     perflutren lipid microspheres (Definity) injection 0.5-10 mL of dilution  -     sulfur hexafluoride microsphr (Lumason) injection 24.28 mg  -     perflutren protein A microsphere (Optison) injection 0.5 mL  -      Insert and maintain peripheral IV; Standing  Coronary arteriosclerosis  Hypercholesterolemia  Benign essential hypertension  Acute diastolic heart failure  -     Transthoracic echo (TTE) complete; Future  -     perflutren lipid microspheres (Definity) injection 0.5-10 mL of dilution  -     sulfur hexafluoride microsphr (Lumason) injection 24.28 mg  -     perflutren protein A microsphere (Optison) injection 0.5 mL  -     Insert and maintain peripheral IV; Standing    In summary, Mr. Huber is a pleasant 74 year-old white male with past medical history significant for hypertension, mild left ventricular hypertrophy, hyperlipidemia, obstructive sleep apnea, family history of premature coronary artery disease, and paroxysmal atrial fibrillation and mild nonobstructive coronary artery disease on angiography.  He notes a 1 month history of dyspnea on exertion and exertional fatigue.  He has not had any angina.  He was noted to be back in atrial fibrillation but is in sinus rhythm today.  He does have evidence of volume overload consistent with acute diastolic heart failure.  I switched his chlorthalidone to furosemide and added potassium.  We will recheck a BMP in 1 week.  I also placed a clinical pharmacy referral for Jardiance.  I ordered an echocardiogram to reassess his LV function.  Instructed him to follow-up with electrophysiology.  I contacted Dr. Araujo who has seen him in the past.  Once his volume status has improved we will consider an ischemic evaluation.  We will see him back in follow-up as scheduled in 2 weeks.    Orders:  Orders Placed This Encounter   Procedures    ECG 12 lead (Clinic Performed)    Transthoracic echo (TTE) complete      Followup Appts:  Future Appointments   Date Time Provider Department Center   8/5/2025  1:30 PM Flavio Stevens MD [id number] [facility]   8/19/2025  2:00 PM JACOB Marks-CNP [id number] [facility]   9/8/2025  1:00 PM Alex Tesfaye MD [id number] [facility]   10/21/2025  2:30 PM  Leobardo Romero MD [id number] [facility]   3/17/2026  1:50 PM Keira Bradley MD [id number] [facility]           ____________________________________________________________  Rodriguez Perrin MD  [facility]

## 2025-08-01 ENCOUNTER — TELEPHONE (OUTPATIENT)
Dept: CARDIOLOGY | Facility: CLINIC | Age: 75
End: 2025-08-01
Payer: MEDICARE

## 2025-08-01 NOTE — TELEPHONE ENCOUNTER
Copied from CRM #[id number]. Topic: Needs Earlier Appointment  >> Aug 1, 2025  1:26 PM Jessica CARRENO wrote:  Appointment Scheduled for Patient.    Nanushka Message created and sent to department pool.  >> Aug 1, 2025  1:35 PM Jessica CARRENO wrote:  Pt Matt Walwilda stated he was recently in an Afib episode that lasted 4 weeks and is requesting an earlier appt per Dr Perrin instructions. Please contact pt at [phone]

## 2025-08-04 ENCOUNTER — APPOINTMENT (OUTPATIENT)
Dept: CARDIOLOGY | Facility: HOSPITAL | Age: 75
End: 2025-08-04
Payer: MEDICARE

## 2025-08-05 ENCOUNTER — APPOINTMENT (OUTPATIENT)
Dept: OPHTHALMOLOGY | Facility: CLINIC | Age: 75
End: 2025-08-05
Payer: MEDICARE

## 2025-08-05 DIAGNOSIS — H35.073 RETINAL TELANGIECTASIA OF BOTH EYES: Primary | ICD-10-CM

## 2025-08-05 PROCEDURE — 92134 CPTRZ OPH DX IMG PST SGM RTA: CPT | Performed by: OPHTHALMOLOGY

## 2025-08-05 PROCEDURE — 99213 OFFICE O/P EST LOW 20 MIN: CPT | Performed by: OPHTHALMOLOGY

## 2025-08-05 ASSESSMENT — VISUAL ACUITY
METHOD: SNELLEN - LINEAR
CORRECTION_TYPE: GLASSES
OD_CC+: -2
OS_CC: 20/25
OD_CC: 20/20
OS_CC+: +2

## 2025-08-05 ASSESSMENT — ENCOUNTER SYMPTOMS
CONSTITUTIONAL NEGATIVE: 0
CARDIOVASCULAR NEGATIVE: 0
GASTROINTESTINAL NEGATIVE: 0
MUSCULOSKELETAL NEGATIVE: 0
RESPIRATORY NEGATIVE: 0
ALLERGIC/IMMUNOLOGIC NEGATIVE: 0
HEMATOLOGIC/LYMPHATIC NEGATIVE: 0
EYES NEGATIVE: 1
NEUROLOGICAL NEGATIVE: 0
PSYCHIATRIC NEGATIVE: 0
ENDOCRINE NEGATIVE: 0

## 2025-08-05 ASSESSMENT — CUP TO DISC RATIO
OS_RATIO: 0.4
OD_RATIO: 0.3

## 2025-08-05 ASSESSMENT — EXTERNAL EXAM - RIGHT EYE: OD_EXAM: NORMAL

## 2025-08-05 ASSESSMENT — SLIT LAMP EXAM - LIDS
COMMENTS: NORMAL
COMMENTS: NORMAL

## 2025-08-05 ASSESSMENT — EXTERNAL EXAM - LEFT EYE: OS_EXAM: NORMAL

## 2025-08-05 ASSESSMENT — TONOMETRY
OD_IOP_MMHG: 12
OS_IOP_MMHG: 11
IOP_METHOD: GOLDMANN APPLANATION

## 2025-08-05 NOTE — PROGRESS NOTES
"  Pharmacist Clinic: Cardiology Management    Matt Huber is a 75 y.o. male was referred to Clinical Pharmacy Team for heart failure and anticoagulation management.     Referring Provider: Rodriguez Perrin MD    THIS IS A NEW PATIENT APPOINTMENT. PATIENT WILL BE ESTABLISHING CARE WITH CLINICAL PHARMACY.    Appointment was completed by Matt who was reached at [phone].    REVIEW OF PAST APPOINTMENT/INTERVAL HISTORY:  N/a    Allergies Reviewed? Yes    Allergies[1]    Medical History[2]    Medications Ordered Prior to Encounter[3]    RELEVANT LAB RESULTS:  Lab Results   Component Value Date    BILITOT 0.6 07/25/2025    CALCIUM 9.6 07/25/2025    CO2 32 07/25/2025     07/25/2025    CREATININE 1.06 07/25/2025    GLUCOSE 104 (H) 07/25/2025    ALKPHOS 93 07/25/2025    K 3.9 07/25/2025    PROT 6.9 07/25/2025     07/25/2025    AST 28 07/25/2025    ALT 39 07/25/2025    BUN 18 07/25/2025    ANIONGAP 9 (L) 07/25/2025    MG 2.19 07/25/2025    ALBUMIN 4.2 07/25/2025    GFRMALE 76 08/30/2023     Lab Results   Component Value Date    TRIG 270 (H) 10/08/2024    CHOL 150 10/08/2024    LDLCALC 51 10/08/2024    HDL 45.5 10/08/2024     No results found for: \"BMCBC\", \"CBCDIF\"     PHARMACEUTICAL ASSESSMENT:    MEDICATION RECONCILIATION  Was a medication reconciliation completed at this visit? Yes  Home Pharmacy Reviewed? Yes, describe: [facility] pharmacy    Added:  - N/a  Changed:  - multivitamin to adult multivitamin  Removed:  - N/a    Drug Interactions? No    Medication Documentation Review Audit       Reviewed by eSrafin PinedoD (Pharmacist) on 08/06/25 at 1008      Medication Order Taking? Sig Documenting Provider Last Dose Status   acetaminophen (Tylenol) 500 mg tablet [id number] Yes Take 2 tablets (1,000 mg) by mouth 2 times a day. Historical Provider, MD  Active   apixaban (Eliquis) 5 mg tablet [id number] Yes Take 1 tablet (5 mg) by mouth 2 times a day. Rodriguez Perrin MD  Active   atorvastatin " (Lipitor) 80 mg tablet [id number] Yes TAKE 1 TABLET ONE TIME DAILY Barbara SANJIV Beau, APRN-CNP  Active   buPROPion XL (Wellbutrin XL) 150 mg 24 hr tablet [id number] Yes Take 2 tablets (300 mg) by mouth once daily. Historical Provider, MD  Active     Discontinued 07/30/25 0955   famotidine (Pepcid) 40 mg tablet [id number] Yes Take 1 tablet (40 mg) by mouth once daily at bedtime. Crissy Osorio MD, MS  Active   flecainide (Tambocor) 100 mg tablet [id number] Yes Take 3 tablets (300 mg) by mouth 1 time if needed. Historical Provider, MD  Active   furosemide (Lasix) 40 mg tablet [id number] Yes Take 1 tablet (40 mg) by mouth once daily. Rodriguez Perrin MD  Active   gabapentin (Neurontin) 300 mg capsule [id number] Yes TAKE 1 CAPSULE DAILY AT BEDTIME IF TOLERATING WELL MAY INCREASE TO 2 CAPSULE DAILY AT BEDTIME Debora Alvarado PA-C  Active   levothyroxine (Synthroid, Levoxyl) 88 mcg tablet [id number] Yes Take 1 tablet (88 mcg) by mouth early in the morning.. Pedro Tesfaye MD  Active   metoprolol succinate XL (Toprol-XL) 50 mg 24 hr tablet [id number] Yes TAKE 3 TABLETS ONE TIME DAILY Pedro Tesfaye MD  Active   multivitamin tablet [id number] Yes Take 1 tablet by mouth once daily. Historical Provider, MD  Active   multivitamin-children's (Cerovite, Jr) chewable tablet [id number]  Chew and swallow 1 tablet once daily.   Patient not taking: Reported on 8/6/2025    Historical Provider, MD  Active   omeprazole (PriLOSEC) 40 mg DR capsule [id number] Yes TAKE 1 CAPSULE TWICE DAILY BEFORE MEALS Pedro Tesfaye MD  Active   potassium chloride CR (Klor-Con M20) 20 mEq ER tablet [id number] Yes Take 1 tablet (20 mEq) by mouth once daily. Do not crush or chew. Rodriguez Perrin MD  Active   sildenafil (Viagra) 50 mg tablet [id number] Yes Take 1 tablet (50 mg) by mouth if needed for erectile dysfunction. Keira Bradley MD  Active   tamsulosin (Flomax) 0.4 mg 24 hr capsule [id number] Yes Take 1 capsule (0.4 mg) by mouth once daily. Keira Bradley MD   Active                    DISEASE MANAGEMENT ASSESSMENT:   CHF ASSESSMENT     Symptom/Staging:  Most recent ejection fraction: 60-65% (9/2022)  NYHA Class: Unknown    Results for orders placed in visit on 09/12/22    Echocardiogram    Narrative  [facility], [address] [place] [address]  Tel [phone] and Fax [phone]    TRANSTHORACIC ECHOCARDIOGRAM REPORT      Patient Name:     SABINA ALBRECHT       Radha Physician:  [id number] Rodriguez SORTO  Study Date:       9/12/2022      Referring           MIKA JANG  Physician:  MRN/PID:          [id number]       PCP:  Accession/Order#: [id number]   Department          [facility]  Location:  YOB: 1950       Fellow:  Gender:           M              Nurse:  Admit Date:       9/12/2022      Sonographer:        Margie Galdamez  [facility]  Admission Status: Outpatient     Additional Staff:  Height:           162.56 cm      CC Report to:  Weight:           90.72 kg       Study Type:         Echocardiogram  BSA:              1.96 m2  Blood Pressure: 123 /74 mmHg    Diagnosis/ICD: R06.00-Dyspnea, unspecified; R06.09-Other forms of dyspnea;  Z01.89-Encounter for other specified special examinations  Indication:    Dyspnea, CAD  Procedure/CPT: Echo Complete w Full [id number]    Patient History:  Pertinent History: HTN, A-Fib, Hyperlipidemia and CAD. H/o Throid disease.    Study Detail: The following Echo studies were performed: 2D, M-Mode, Doppler and  color flow.      PHYSICIAN INTERPRETATION:  Left Ventricle: The left ventricular systolic function is normal, with an estimated ejection fraction of 60-65%. There are no regional wall motion abnormalities. The left ventricular cavity size is normal. There is mild concentric left ventricular hypertrophy. Spectral Doppler shows a normal pattern of left ventricular diastolic filling.  Left Atrium: The left atrium is severely dilated.  Right Ventricle: The right ventricle  is normal in size. There is normal right ventricular global systolic function.  Right Atrium: The right atrium is normal in size.  Aortic Valve: The aortic valve is trileaflet. There is no evidence of aortic valve regurgitation. The peak instantaneous gradient of the aortic valve is 4.6 mmHg. The mean gradient of the aortic valve is 3.0 mmHg.  Mitral Valve: The mitral valve is mildly thickened. There is moderate mitral annular calcification. There is mild mitral valve regurgitation.  Tricuspid Valve: The tricuspid valve is structurally normal. There is trace tricuspid regurgitation.  Pulmonic Valve: The pulmonic valve is not well visualized. The pulmonic valve regurgitation was not well visualized.  Pericardium: There is no pericardial effusion noted.  Aorta: The aortic root is normal.  In comparison to the previous echocardiogram(s): Compared with study from 10/24/2016, The LA is now severely dilated.      CONCLUSIONS:  1. Left ventricular systolic function is normal with a 60-65% estimated ejection fraction.  2. The left atrium is severely dilated.  3. There is moderate mitral annular calcification.    QUANTITATIVE DATA SUMMARY:  2D MEASUREMENTS:  Normal Ranges:  LAs:           4.80 cm    (2.7-4.0cm)  IVSd:          1.30 cm    (0.6-1.1cm)  LVPWd:         1.30 cm    (0.6-1.1cm)  LVIDd:         4.40 cm    (3.9-5.9cm)  LVIDs:         2.90 cm  LV Mass Index: 110.0 g/m2  LV % FS        34.1 %    LA VOLUME:  Normal Ranges:  LA Vol A4C:        100.0 ml   (22+/-6mL/m2)  LA Vol A2C:        96.3 ml  LA Vol BP:         101.6 ml  LA Vol Index A4C:  51.1ml/m2  LA Vol Index A2C:  49.2 ml/m2  LA Vol Index BP:   51.9 ml/m2  LA Area A4C:       26.9 cm2  LA Area A2C:       25.5 cm2  LA Major Axis A4C: 6.2 cm  LA Major Axis A2C: 5.7 cm  LA Volume Index:   51.9 ml/m2    M-MODE MEASUREMENTS:  Normal Ranges:  Ao Root: 3.60 cm (2.0-3.7cm)  LAs:     5.30 cm (2.7-4.0cm)    AORTA MEASUREMENTS:  Normal Ranges:  Ao Sinus, d: 3.21 cm  (2.1-3.5cm)  Ao STJ, d:   3.20 cm (1.7-3.4cm)  Asc Ao, d:   3.50 cm (2.1-3.4cm)    LV SYSTOLIC FUNCTION BY 2D PLANIMETRY (MOD):  Normal Ranges:  EF-A4C View: 59.0 % (>55%)  EF-A2C View: 65.5 %  EF-Biplane:  61.9 %    LV DIASTOLIC FUNCTION:  Normal Ranges:  MV Peak E:        0.73 m/s    (0.7-1.2 m/s)  MV Peak A:        0.47 m/s    (0.42-0.7 m/s)  E/A Ratio:        1.56        (1.0-2.2)  MV lateral e'     0.08 m/s  MV medial e'      0.06 m/s  MV A Dur:         124.00 msec  E/e' Ratio:       9.40        (<8.0)  PulmV Sys Mahad:    58.60 cm/s  PulmV Ervin Mahad:   55.20 cm/s  PulmV S/D Mahad:    1.10  PulmV A Revs Mahad: 28.10 cm/s  PulmV A Revs Dur: 98.00 msec    MITRAL VALVE:  Normal Ranges:  MV Vmax:    0.82 m/s (<1.3m/s)  MV peak P.7 mmHg (<5mmHg)  MV mean P.0 mmHg (<2mmHg)  MV DT:      206 msec (150-240msec)    AORTIC VALVE:  Normal Ranges:  AoV Vmax:                1.07 m/s (<1.7m/s)  AoV Peak P.6 mmHg (<20mmHg)  AoV Mean PG:             3.0 mmHg (1.7-11.5mmHg)  LVOT Max Mahad:            0.83 m/s (<1.1m/s)  AoV VTI:                 24.80 cm (18-25cm)  LVOT VTI:                19.90 cm  LVOT Diameter:           2.20 cm  (1.8-2.4cm)  AoV Area, VTI:           3.05 cm2 (2.5-5.5cm2)  AoV Area,Vmax:           2.96 cm2 (2.5-4.5cm2)  AoV Dimensionless Index: 0.80    RIGHT VENTRICLE:  RV 1   3.71 cm  RV 2   2.57 cm  RV 3   8.22 cm  TAPSE: 23.7 mm    PULMONIC VALVE:  Normal Ranges:  PV Accel Time: 69 msec  (>120ms)  PV Max Mahad:    1.0 m/s  (0.6-0.9m/s)  PV Max P.0 mmHg    Pulmonary Veins:  PulmV A Revs Dur: 98.00 msec  PulmV A Revs Mahad: 28.10 cm/s  PulmV Ervin Mahad:   55.20 cm/s  PulmV S/D Mahad:    1.10  PulmV Sys Mahad:    58.60 cm/s      [id number] Rodriguez Perrin MD  Electronically signed on 2022 at 2:01:35 PM    CURRENT PHARMACOTHERAPY:   Metoprolol succinate 150 mg daily  Furosemide 40 mg daily    Guideline-Directed Medical Therapy:  ARNI: No  If no, then ACEi/ARB?: No - allergy to ACEi  Beta Blocker:  Yes, describe: metoprolol succinate 150 mg daily  MRA: No  SGLT2i: No    Secondary Prevention:  The 10-year ASCVD risk score (Flory LIMON, et al., 2019) is: 28.4%    Values used to calculate the score:      Age: 75 years      Clincally relevant sex: Male      Is Non- : No      Diabetic: No      Tobacco smoker: No      Systolic Blood Pressure: 133 mmHg      Is BP treated: Yes      HDL Cholesterol: 45.5 mg/dL      Total Cholesterol: 150 mg/dL   Aspirin 81mg? no; Eliquis 5 mg BID  Statin?: Yes, describe: atorvastatin 80 mg daily  HTN?: Yes, describe: BP at goal at last appt    Adverse Effects: Denies    Past In Office Weight Readings:   Wt Readings from Last 6 Encounters:   07/30/25 87 kg (191 lb 14.4 oz)   07/25/25 88.9 kg (196 lb)   07/23/25 89.4 kg (197 lb)   07/22/25 88.5 kg (195 lb)   03/04/25 88.9 kg (196 lb)   02/11/25 88.9 kg (196 lb)     Past In Office BP Readings:   BP Readings from Last 6 Encounters:   07/30/25 133/80   07/25/25 (!) 120/94   07/23/25 138/76   02/11/25 110/62   12/04/24 133/88   09/09/24 125/81       HEALTH MANAGEMENT  Maintaining fluid restriction (<2 L/day): No  Edema/swelling: No  Shortness of breath: Yes when in afib  Trouble sleeping/lying down: Yes due to spinal surgery and has restricted movement leading to being uncomfortable  Has sleep number bed that head raises/sleeps on side  Dry/hacking cough: Yes; random, typically related to allergens (has cats, allergic to pollen/dust)  Recent Hospitalizations: Yes, describe: ED visit 7/25    EDUCATION/COUNSELING:   Counseled patient on MOA, expectations, duration of therapy, contraindications, administration, and monitoring parameters  Counseled patient on lifestyle modifications that can decrease your risk of having complications (smoking cessation, losing weight, daily weights, vaccines)  Counseled patient on fluid intake and weight management. Recommended to not consume more than 2 liters of fliuids per day. If they  have gained more than 2-3 pounds within a 24 hours period (or 5 pounds in a week), contact their cardiologist  Answered all patient questions and concerns    ANTICOAGULATION ASSESSMENT    The 10-year ASCVD risk score (Flory LIMON, et al., 2019) is: 28.4%    Values used to calculate the score:      Age: 75 years      Clincally relevant sex: Male      Is Non- : No      Diabetic: No      Tobacco smoker: No      Systolic Blood Pressure: 133 mmHg      Is BP treated: Yes      HDL Cholesterol: 45.5 mg/dL      Total Cholesterol: 150 mg/dL    DIAGNOSIS: prevention of nonvalvular atrial fibrilliation stroke and systemic embolism  Patient is projected to be on anticoagulation indefinitely  ASHLEY VASC SCORING CALCULATOR:   [id number] Stroke Risk Points: 4   Values used to calculate this score:    Points  Metrics       1        Has Congestive Heart Failure: Yes       1        Has Hypertension: Yes       2        Age: 75       0        Has Diabetes: No       0        Had Stroke: No                 Had TIA: No                 Had Thromboembolism: No       0        Has Vascular Disease: No       0        Clinically Relevant Sex: Male  Lip GH, et al. 2009. © 2010 American [employer] Physicians     CURRENT PHARMACOTHERAPY:    Eliquis 5 mg BID  76 y/o  87 kg (7/30/2025)  Scr 1.06 mg/dL (7/25/2025)    RELEVANT PAST MEDICAL HISTORY:   Affordability/Accessibility: Reports Eliquis and Jardiance are most expensive  Adverse Reactions: Denies  Recent Hospitalizations: ED visit 7/25  Recent Falls/Trauma: Denies  Changes in Tobacco or Alcohol Intake:   Tobacco: former smoker (45 years ago)  Alcohol: Occasional    EDUCATION/COUNSELING:   Counseled patient on MOA, expectations, duration of therapy, contraindications, administration, and monitoring parameters  Counseled patient of side effects that are indicative of bleeding such as dark tarry stool, unexplainable bruising, or vomiting up a coffee ground like  substance  Answered all patient questions and concerns    DISCUSSION/NOTES:   Spoke with Matt today regarding HF and anticoagulation with Eliquis, as well as cost of medications  Regarding HF - patient reports he started furosemide/K+ recently. Reports increased urination, however denies edema. Does report some random episodes of lightheadedness of which is manageable.   Regarding afib - patient believes he is in afib now. Has upcoming appt with cardio next week. Denies any unusual bleeding/bruising with Eliquis.  Screened for  PAP to help with cost of medications  Unfortunately, patient is over income cut off and therefore does not qualify for  PAP  Advised patient to call number on back of insurance card and discuss if payment plan is more cost-efficient (Eliquis)  Advised patient may qualify for Push IO for Jardiance (Eliquis is not an eligible medication through [employer])  Provided phone number of [phone] to call and apply  If approved, patient will receive coupon that he can use at pharmacy that will bring copay down to $0  Will follow-up in 2 weeks to ensure patient was approved for Push IO, or discuss alternative options    ASSESSMENT:    Assessment/Plan   Problem List Items Addressed This Visit       Paroxysmal atrial fibrillation (Multi) - Primary    Matt continues on anticoagulation with Eliquis. No dosage adjustment required for age, weight, and renal function.          Acute diastolic heart failure    Matt is prescribed 1/4 GDMT. No dosage titrations recommended today due to lack of home BP readings. Will attempt to sign patient up for Push [employer] [facility] and add Jardiance 10 mg daily to regimen.          RECOMMENDATIONS/PLAN:    CONTINUE:   HF:  Metoprolol succinate 150 mg daily  Furosemide 40 mg daily  Anticoagulation:  Eliquis 5 mg BID  START:  HF:  Jardiance 10 mg daily    Last Appnt with Referring Provider: 7.30.2025  Next Appnt with Referring  Provider: Not scheduled; 8/2025 (Beau)  Clinical Pharmacist follow up: 8/20/2025 @ 10 am  VAF/Application Expiration: No  Type of Encounter: Virtual    Thank you,  Adrianna Cleaning, Rach    Verbal consent to manage patient's drug therapy was obtained from the patient . They were informed they may decline to participate or withdraw from participation in pharmacy services at any time.    Continue all meds under the continuation of care with the referring provider and clinical pharmacy team.         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    Displaced fracture of coronoid process of left ulna, initial encounter for closed fracture 04/22/2015    Fracture of coronoid process of left ulna    Exotropia of right eye     Hypermetropia of both eyes     Hypertension     Lumbar stenosis with neurogenic claudication 10/21/2023    Medial epicondylitis, left elbow 04/13/2015    Medial epicondylitis of left elbow    Monocular diplopia     Obstructive sleep apnea 10/21/2023    Osteoarthritis of left hip 10/21/2023    Other chondrocalcinosis, right knee 08/05/2015    Chondrocalcinosis of knee, right    Other enthesopathies, not elsewhere classified 03/31/2014    Tendonitis of shoulder    Other enthesopathies, not elsewhere classified 05/22/2015    Tendonitis of elbow, left    Pain in left knee 01/10/2020    Left knee pain    Pain in unspecified hip 02/24/2016    Hip pain     Pain in unspecified shoulder 03/18/2014    Shoulder pain    Paroxysmal atrial fibrillation (Multi) 11/22/2022    Paroxysmal atrial fibrillation    Personal history of other diseases of the circulatory system 01/31/2014    History of mitral valve insufficiency    Personal history of other diseases of the musculoskeletal system and connective tissue 12/30/2014    History of trigger finger    Prediabetes 10/21/2023    Presbyopia of both eyes     Strabismus     Type 2 macular telangiectasis     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      apixaban (Eliquis) 5 mg tablet Take 1 tablet (5 mg) by mouth 2 times a day. 180 tablet 3    atorvastatin (Lipitor) 80 mg tablet TAKE 1 TABLET ONE TIME DAILY 90 tablet 3    buPROPion XL (Wellbutrin XL) 150 mg 24 hr tablet Take 2 tablets (300 mg) by mouth once daily.      famotidine (Pepcid) 40 mg tablet Take 1 tablet (40 mg) by mouth once daily at bedtime. 90 tablet 3    flecainide (Tambocor) 100 mg tablet Take 3 tablets (300 mg) by mouth 1 time if needed.      furosemide (Lasix) 40 mg tablet Take 1 tablet (40 mg) by mouth once daily. 90 tablet 3    gabapentin (Neurontin) 300 mg capsule TAKE 1 CAPSULE DAILY AT BEDTIME IF TOLERATING WELL MAY INCREASE TO 2 CAPSULE DAILY AT BEDTIME 60 capsule 5    levothyroxine (Synthroid, Levoxyl) 88 mcg tablet Take 1 tablet (88 mcg) by mouth early in  the morning.. 90 tablet 3    metoprolol succinate XL (Toprol-XL) 50 mg 24 hr tablet TAKE 3 TABLETS ONE TIME DAILY 270 tablet 3    multivitamin tablet Take 1 tablet by mouth once daily.      omeprazole (PriLOSEC) 40 mg DR capsule TAKE 1 CAPSULE TWICE DAILY BEFORE MEALS 180 capsule 3    potassium chloride CR (Klor-Con M20) 20 mEq ER tablet Take 1 tablet (20 mEq) by mouth once daily. Do not crush or chew. 90 tablet 3    sildenafil (Viagra) 50 mg tablet Take 1 tablet (50 mg) by mouth if needed for erectile dysfunction. 30 tablet 6    tamsulosin (Flomax) 0.4 mg 24 hr capsule Take 1 capsule (0.4 mg) by mouth once daily. 90 capsule 3    multivitamin-children's (Cerovite, Jr) chewable tablet Chew and swallow 1 tablet once daily. (Patient not taking: Reported on 8/6/2025)       No current facility-administered medications on file prior to visit.

## 2025-08-05 NOTE — PROGRESS NOTES
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, both eyes-Stable  7 H52.00 Hyperopia-Stable  8 H50.10 Exotropia of right eye-Stable  9 H53.2 Diplopia-Stable  10 H53.2 Monocular diplopia of left eye-Stable    Myopic retinoapthy  Amblopia right eye (OD)  Recent intraocular lens (IOL) PO doing well   Assessment/Plan   Diagnoses and all orders for this visit:  Myopia, bilateral  -     OCT, Retina - OU - Both Eyes  Retinal telangiectasia of both eyes  -     OCT, Retina - OU - Both Eyes  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

## 2025-08-06 ENCOUNTER — APPOINTMENT (OUTPATIENT)
Dept: PHARMACY | Facility: HOSPITAL | Age: 75
End: 2025-08-06
Payer: MEDICARE

## 2025-08-06 DIAGNOSIS — I48.0 PAROXYSMAL ATRIAL FIBRILLATION (MULTI): Primary | ICD-10-CM

## 2025-08-06 DIAGNOSIS — I50.31 ACUTE DIASTOLIC HEART FAILURE: ICD-10-CM

## 2025-08-06 RX ORDER — BISMUTH SUBSALICYLATE 262 MG
1 TABLET,CHEWABLE ORAL DAILY
COMMUNITY

## 2025-08-06 NOTE — ASSESSMENT & PLAN NOTE
Matt continues on anticoagulation with Eliquis. No dosage adjustment required for age, weight, and renal function.

## 2025-08-06 NOTE — ASSESSMENT & PLAN NOTE
Matt is prescribed 1/4 GDMT. No dosage titrations recommended today due to lack of home BP readings. Will attempt to sign patient up for [facility] and add Jardiance 10 mg daily to regimen.

## 2025-08-08 LAB
ANION GAP SERPL CALCULATED.4IONS-SCNC: 9 MMOL/L (CALC) (ref 7–17)
BNP SERPL-MCNC: 402 PG/ML
BUN SERPL-MCNC: 24 MG/DL (ref 7–25)
BUN/CREAT SERPL: ABNORMAL (CALC) (ref 6–22)
CALCIUM SERPL-MCNC: 9.5 MG/DL (ref 8.6–10.3)
CHLORIDE SERPL-SCNC: 104 MMOL/L (ref 98–110)
CO2 SERPL-SCNC: 27 MMOL/L (ref 20–32)
CREAT SERPL-MCNC: 1.17 MG/DL (ref 0.7–1.28)
EGFRCR SERPLBLD CKD-EPI 2021: 65 ML/MIN/1.73M2
GLUCOSE SERPL-MCNC: 110 MG/DL (ref 65–99)
POTASSIUM SERPL-SCNC: 3.8 MMOL/L (ref 3.5–5.3)
SODIUM SERPL-SCNC: 140 MMOL/L (ref 135–146)

## 2025-08-14 ENCOUNTER — NURSE ONLY (OUTPATIENT)
Dept: CARDIOLOGY | Facility: HOSPITAL | Age: 75
End: 2025-08-14
Payer: MEDICARE

## 2025-08-14 ENCOUNTER — HOSPITAL ENCOUNTER (OUTPATIENT)
Dept: CARDIOLOGY | Facility: HOSPITAL | Age: 75
Discharge: HOME | End: 2025-08-14
Payer: MEDICARE

## 2025-08-14 DIAGNOSIS — I48.0 PAROXYSMAL ATRIAL FIBRILLATION (MULTI): ICD-10-CM

## 2025-08-14 DIAGNOSIS — I50.31 ACUTE DIASTOLIC HEART FAILURE: ICD-10-CM

## 2025-08-14 LAB
AORTIC VALVE MEAN GRADIENT: 3 MMHG
AORTIC VALVE PEAK VELOCITY: 1.01 M/S
AV PEAK GRADIENT: 4 MMHG
AVA (PEAK VEL): 2.91 CM2
AVA (VTI): 2.66 CM2
EJECTION FRACTION APICAL 4 CHAMBER: 57.7
EJECTION FRACTION: 63 %
LEFT ATRIUM VOLUME AREA LENGTH INDEX BSA: 56.2 ML/M2
LEFT VENTRICLE INTERNAL DIMENSION DIASTOLE: 4.17 CM (ref 3.5–6)
LEFT VENTRICULAR OUTFLOW TRACT DIAMETER: 2.17 CM
RIGHT VENTRICLE FREE WALL PEAK S': 9 CM/S
RIGHT VENTRICLE PEAK SYSTOLIC PRESSURE: 26 MMHG
TRICUSPID ANNULAR PLANE SYSTOLIC EXCURSION: 1.8 CM

## 2025-08-14 PROCEDURE — 93306 TTE W/DOPPLER COMPLETE: CPT | Performed by: INTERNAL MEDICINE

## 2025-08-14 PROCEDURE — 99211 OFF/OP EST MAY X REQ PHY/QHP: CPT | Mod: 25

## 2025-08-14 PROCEDURE — 93306 TTE W/DOPPLER COMPLETE: CPT

## 2025-08-15 DIAGNOSIS — I48.0 PAROXYSMAL ATRIAL FIBRILLATION (MULTI): Primary | ICD-10-CM

## 2025-08-16 DIAGNOSIS — E78.00 HYPERCHOLESTEROLEMIA: Primary | ICD-10-CM

## 2025-08-18 RX ORDER — ATORVASTATIN CALCIUM 80 MG/1
80 TABLET, FILM COATED ORAL DAILY
Qty: 90 TABLET | Refills: 3 | Status: SHIPPED | OUTPATIENT
Start: 2025-08-18 | End: 2026-08-18

## 2025-08-19 ENCOUNTER — APPOINTMENT (OUTPATIENT)
Dept: CARDIOLOGY | Facility: HOSPITAL | Age: 75
End: 2025-08-19
Payer: MEDICARE

## 2025-08-19 DIAGNOSIS — E03.9 HYPOTHYROIDISM, UNSPECIFIED TYPE: ICD-10-CM

## 2025-08-19 DIAGNOSIS — I10 BENIGN ESSENTIAL HYPERTENSION: ICD-10-CM

## 2025-08-19 RX ORDER — LEVOTHYROXINE SODIUM 88 UG/1
88 TABLET ORAL DAILY
Qty: 90 TABLET | Refills: 3 | Status: SHIPPED | OUTPATIENT
Start: 2025-08-19

## 2025-08-19 RX ORDER — METOPROLOL SUCCINATE 50 MG/1
50 TABLET, EXTENDED RELEASE ORAL DAILY
Qty: 90 TABLET | Refills: 3 | Status: SHIPPED | OUTPATIENT
Start: 2025-08-19 | End: 2026-08-19

## 2025-08-20 ENCOUNTER — APPOINTMENT (OUTPATIENT)
Dept: PHARMACY | Facility: HOSPITAL | Age: 75
End: 2025-08-20
Payer: MEDICARE

## 2025-08-20 DIAGNOSIS — I50.31 ACUTE DIASTOLIC HEART FAILURE: ICD-10-CM

## 2025-08-20 DIAGNOSIS — I48.0 PAROXYSMAL ATRIAL FIBRILLATION (MULTI): ICD-10-CM

## 2025-08-21 ENCOUNTER — ANESTHESIA EVENT (OUTPATIENT)
Dept: CARDIOLOGY | Facility: HOSPITAL | Age: 75
End: 2025-08-21
Payer: MEDICARE

## 2025-08-21 PROBLEM — H91.90 HEARING LOSS: Status: ACTIVE | Noted: 2025-08-21

## 2025-08-21 PROBLEM — E78.5 HYPERLIPIDEMIA: Status: ACTIVE | Noted: 2023-08-30

## 2025-08-21 PROBLEM — H26.9 CATARACT PRESENT: Status: ACTIVE | Noted: 2025-08-21

## 2025-08-21 PROBLEM — K21.9 GASTROESOPHAGEAL REFLUX DISEASE: Status: ACTIVE | Noted: 2025-08-21

## 2025-08-21 PROBLEM — M19.90 OSTEOARTHRITIS: Status: ACTIVE | Noted: 2025-08-21

## 2025-08-21 PROBLEM — D64.9 ANEMIA: Status: ACTIVE | Noted: 2025-08-21

## 2025-08-21 PROBLEM — Z79.01 ANTICOAGULANT LONG-TERM USE: Status: ACTIVE | Noted: 2025-08-21

## 2025-08-21 PROBLEM — F32.A DEPRESSION: Status: ACTIVE | Noted: 2025-08-21

## 2025-08-22 ENCOUNTER — ANESTHESIA (OUTPATIENT)
Dept: CARDIOLOGY | Facility: HOSPITAL | Age: 75
End: 2025-08-22
Payer: MEDICARE

## 2025-08-22 ENCOUNTER — HOSPITAL ENCOUNTER (OUTPATIENT)
Dept: CARDIOLOGY | Facility: HOSPITAL | Age: 75
Discharge: HOME | End: 2025-08-22
Payer: MEDICARE

## 2025-08-22 VITALS
OXYGEN SATURATION: 97 % | RESPIRATION RATE: 16 BRPM | BODY MASS INDEX: 31.82 KG/M2 | SYSTOLIC BLOOD PRESSURE: 104 MMHG | HEIGHT: 65 IN | DIASTOLIC BLOOD PRESSURE: 69 MMHG | HEART RATE: 59 BPM | TEMPERATURE: 97.5 F | WEIGHT: 191 LBS

## 2025-08-22 DIAGNOSIS — I48.0 PAROXYSMAL ATRIAL FIBRILLATION (MULTI): ICD-10-CM

## 2025-08-22 LAB — BODY SURFACE AREA: 1.99 M2

## 2025-08-22 PROCEDURE — 93010 ELECTROCARDIOGRAM REPORT: CPT | Performed by: INTERNAL MEDICINE

## 2025-08-22 PROCEDURE — 3700000002 HC GENERAL ANESTHESIA TIME - EACH INCREMENTAL 1 MINUTE

## 2025-08-22 PROCEDURE — 3700000001 HC GENERAL ANESTHESIA TIME - INITIAL BASE CHARGE

## 2025-08-22 PROCEDURE — 2500000004 HC RX 250 GENERAL PHARMACY W/ HCPCS (ALT 636 FOR OP/ED): Performed by: NURSE PRACTITIONER

## 2025-08-22 PROCEDURE — 99222 1ST HOSP IP/OBS MODERATE 55: CPT | Performed by: NURSE PRACTITIONER

## 2025-08-22 PROCEDURE — 2500000004 HC RX 250 GENERAL PHARMACY W/ HCPCS (ALT 636 FOR OP/ED): Mod: JW | Performed by: ANESTHESIOLOGIST ASSISTANT

## 2025-08-22 PROCEDURE — 92960 CARDIOVERSION ELECTRIC EXT: CPT

## 2025-08-22 PROCEDURE — 92960 CARDIOVERSION ELECTRIC EXT: CPT | Performed by: INTERNAL MEDICINE

## 2025-08-22 RX ORDER — SODIUM CHLORIDE 9 MG/ML
100 INJECTION, SOLUTION INTRAVENOUS CONTINUOUS
Status: DISCONTINUED | OUTPATIENT
Start: 2025-08-22 | End: 2025-08-23 | Stop reason: HOSPADM

## 2025-08-22 RX ORDER — PROPOFOL 10 MG/ML
INJECTION, EMULSION INTRAVENOUS AS NEEDED
Status: DISCONTINUED | OUTPATIENT
Start: 2025-08-22 | End: 2025-08-22

## 2025-08-22 RX ADMIN — PROPOFOL 40 MG: 10 INJECTION, EMULSION INTRAVENOUS at 12:07

## 2025-08-22 RX ADMIN — SODIUM CHLORIDE 100 ML/HR: 0.9 INJECTION, SOLUTION INTRAVENOUS at 11:26

## 2025-08-22 ASSESSMENT — ENCOUNTER SYMPTOMS
NEUROLOGICAL NEGATIVE: 1
ENDOCRINE NEGATIVE: 1
EYES NEGATIVE: 1
ALLERGIC/IMMUNOLOGIC NEGATIVE: 1
CARDIOVASCULAR NEGATIVE: 1
HEMATOLOGIC/LYMPHATIC NEGATIVE: 1
FATIGUE: 1
MUSCULOSKELETAL NEGATIVE: 1
SHORTNESS OF BREATH: 1
PSYCHIATRIC NEGATIVE: 1
GASTROINTESTINAL NEGATIVE: 1

## 2025-08-22 ASSESSMENT — PAIN SCALES - GENERAL
PAIN_LEVEL: 0
PAINLEVEL_OUTOF10: 0 - NO PAIN

## 2025-08-22 ASSESSMENT — PAIN - FUNCTIONAL ASSESSMENT
PAIN_FUNCTIONAL_ASSESSMENT: 0-10

## 2025-08-25 ENCOUNTER — HOSPITAL ENCOUNTER (OUTPATIENT)
Dept: CARDIOLOGY | Facility: HOSPITAL | Age: 75
Discharge: HOME | End: 2025-08-25
Payer: MEDICARE

## 2025-08-25 PROCEDURE — 93005 ELECTROCARDIOGRAM TRACING: CPT

## 2025-08-27 ENCOUNTER — TELEPHONE (OUTPATIENT)
Dept: CARDIOLOGY | Facility: HOSPITAL | Age: 75
End: 2025-08-27

## 2025-08-27 ENCOUNTER — OFFICE VISIT (OUTPATIENT)
Dept: CARDIOLOGY | Facility: CLINIC | Age: 75
End: 2025-08-27
Payer: MEDICARE

## 2025-08-27 ENCOUNTER — HOSPITAL ENCOUNTER (OUTPATIENT)
Dept: CARDIOLOGY | Facility: CLINIC | Age: 75
Discharge: HOME | End: 2025-08-27
Payer: MEDICARE

## 2025-08-27 VITALS
DIASTOLIC BLOOD PRESSURE: 79 MMHG | SYSTOLIC BLOOD PRESSURE: 118 MMHG | HEART RATE: 60 BPM | BODY MASS INDEX: 33.29 KG/M2 | WEIGHT: 195 LBS | HEIGHT: 64 IN | OXYGEN SATURATION: 96 %

## 2025-08-27 DIAGNOSIS — I48.0 PAROXYSMAL ATRIAL FIBRILLATION (MULTI): ICD-10-CM

## 2025-08-27 DIAGNOSIS — I48.19 PERSISTENT ATRIAL FIBRILLATION (MULTI): ICD-10-CM

## 2025-08-27 DIAGNOSIS — I48.19 PERSISTENT ATRIAL FIBRILLATION (MULTI): Primary | ICD-10-CM

## 2025-08-27 DIAGNOSIS — I50.31 ACUTE DIASTOLIC HEART FAILURE: Primary | ICD-10-CM

## 2025-08-27 LAB
ATRIAL RATE: 53 BPM
BODY SURFACE AREA: 2 M2
Q ONSET: 225 MS
QRS COUNT: 11 BEATS
QRS DURATION: 88 MS
QT INTERVAL: 428 MS
QTC CALCULATION(BAZETT): 455 MS
QTC FREDERICIA: 446 MS
R AXIS: 5 DEGREES
T AXIS: 7 DEGREES
T OFFSET: 439 MS
VENTRICULAR RATE: 68 BPM

## 2025-08-27 PROCEDURE — 93005 ELECTROCARDIOGRAM TRACING: CPT | Performed by: INTERNAL MEDICINE

## 2025-08-27 PROCEDURE — 3078F DIAST BP <80 MM HG: CPT | Performed by: INTERNAL MEDICINE

## 2025-08-27 PROCEDURE — 1159F MED LIST DOCD IN RCRD: CPT | Performed by: INTERNAL MEDICINE

## 2025-08-27 PROCEDURE — 93246 EXT ECG>7D<15D RECORDING: CPT

## 2025-08-27 PROCEDURE — 99212 OFFICE O/P EST SF 10 MIN: CPT | Performed by: INTERNAL MEDICINE

## 2025-08-27 PROCEDURE — 1126F AMNT PAIN NOTED NONE PRSNT: CPT | Performed by: INTERNAL MEDICINE

## 2025-08-27 PROCEDURE — 1036F TOBACCO NON-USER: CPT | Performed by: INTERNAL MEDICINE

## 2025-08-27 PROCEDURE — 99214 OFFICE O/P EST MOD 30 MIN: CPT | Performed by: INTERNAL MEDICINE

## 2025-08-27 PROCEDURE — 3074F SYST BP LT 130 MM HG: CPT | Performed by: INTERNAL MEDICINE

## 2025-08-27 RX ORDER — AMMONIUM LACTATE 12 G/100G
LOTION TOPICAL AS NEEDED
COMMUNITY
Start: 2025-06-02

## 2025-08-27 ASSESSMENT — ENCOUNTER SYMPTOMS
DEPRESSION: 0
LOSS OF SENSATION IN FEET: 0
OCCASIONAL FEELINGS OF UNSTEADINESS: 0

## 2025-08-27 ASSESSMENT — CHA2DS2 SCORE
CHA2D2S VASC SCORE: 3
VASCULAR DISEASE: NO
CHF OR LEFT VENTRICULAR DYSFUNCTION: NO
AGE IN YEARS: 75+
SEX: MALE
PRIOR STROKE OR TIA OR THROMBOEMBOLISM: NO
HYPERTENSION: YES
DIABETES: NO

## 2025-08-27 ASSESSMENT — PAIN SCALES - GENERAL: PAINLEVEL_OUTOF10: 0-NO PAIN

## 2025-08-28 LAB
ATRIAL RATE: 60 BPM
P AXIS: 49 DEGREES
P OFFSET: 192 MS
P ONSET: 133 MS
PR INTERVAL: 178 MS
Q ONSET: 222 MS
QRS COUNT: 10 BEATS
QRS DURATION: 84 MS
QT INTERVAL: 474 MS
QTC CALCULATION(BAZETT): 474 MS
QTC FREDERICIA: 474 MS
R AXIS: 14 DEGREES
T AXIS: 252 DEGREES
T OFFSET: 459 MS
VENTRICULAR RATE: 60 BPM

## 2025-09-06 LAB
ANION GAP SERPL CALCULATED.4IONS-SCNC: 9 MMOL/L (CALC) (ref 7–17)
BUN SERPL-MCNC: 23 MG/DL (ref 7–25)
BUN/CREAT SERPL: ABNORMAL (CALC) (ref 6–22)
CALCIUM SERPL-MCNC: 9.4 MG/DL (ref 8.6–10.3)
CHLORIDE SERPL-SCNC: 106 MMOL/L (ref 98–110)
CO2 SERPL-SCNC: 27 MMOL/L (ref 20–32)
CREAT SERPL-MCNC: 1.21 MG/DL (ref 0.7–1.28)
EGFRCR SERPLBLD CKD-EPI 2021: 62 ML/MIN/1.73M2
GLUCOSE SERPL-MCNC: 142 MG/DL (ref 65–99)
POTASSIUM SERPL-SCNC: 4.1 MMOL/L (ref 3.5–5.3)
SODIUM SERPL-SCNC: 142 MMOL/L (ref 135–146)

## 2025-09-08 ENCOUNTER — APPOINTMENT (OUTPATIENT)
Dept: PRIMARY CARE | Facility: CLINIC | Age: 75
End: 2025-09-08
Payer: MEDICARE

## 2025-09-10 ENCOUNTER — APPOINTMENT (OUTPATIENT)
Dept: PHARMACY | Facility: HOSPITAL | Age: 75
End: 2025-09-10
Payer: MEDICARE

## 2025-10-15 ENCOUNTER — APPOINTMENT (OUTPATIENT)
Dept: CARDIOLOGY | Facility: CLINIC | Age: 75
End: 2025-10-15
Payer: MEDICARE

## 2026-03-17 ENCOUNTER — APPOINTMENT (OUTPATIENT)
Dept: UROLOGY | Facility: CLINIC | Age: 76
End: 2026-03-17
Payer: MEDICARE

## 2026-08-06 ENCOUNTER — APPOINTMENT (OUTPATIENT)
Dept: OPHTHALMOLOGY | Facility: CLINIC | Age: 76
End: 2026-08-06
Payer: MEDICARE